# Patient Record
Sex: FEMALE | Race: WHITE
[De-identification: names, ages, dates, MRNs, and addresses within clinical notes are randomized per-mention and may not be internally consistent; named-entity substitution may affect disease eponyms.]

---

## 2019-03-13 ENCOUNTER — HOSPITAL ENCOUNTER (INPATIENT)
Dept: HOSPITAL 84 - D.ER | Age: 79
LOS: 7 days | Discharge: HOME | DRG: 885 | End: 2019-03-20
Attending: PSYCHIATRY & NEUROLOGY | Admitting: PSYCHIATRY & NEUROLOGY
Payer: MEDICARE

## 2019-03-13 VITALS — BODY MASS INDEX: 28.6 KG/M2 | BODY MASS INDEX: 28.7 KG/M2 | WEIGHT: 137 LBS

## 2019-03-13 VITALS — SYSTOLIC BLOOD PRESSURE: 133 MMHG | DIASTOLIC BLOOD PRESSURE: 67 MMHG

## 2019-03-13 VITALS — DIASTOLIC BLOOD PRESSURE: 67 MMHG | SYSTOLIC BLOOD PRESSURE: 133 MMHG

## 2019-03-13 DIAGNOSIS — E03.9: ICD-10-CM

## 2019-03-13 DIAGNOSIS — G30.1: ICD-10-CM

## 2019-03-13 DIAGNOSIS — F33.1: Primary | ICD-10-CM

## 2019-03-13 DIAGNOSIS — F02.81: ICD-10-CM

## 2019-03-13 DIAGNOSIS — E78.5: ICD-10-CM

## 2019-03-13 DIAGNOSIS — R45.851: ICD-10-CM

## 2019-03-13 DIAGNOSIS — M17.12: ICD-10-CM

## 2019-03-13 LAB
ALBUMIN SERPL-MCNC: 3.6 G/DL (ref 3.4–5)
ALP SERPL-CCNC: 94 U/L (ref 46–116)
ALT SERPL-CCNC: 18 U/L (ref 10–68)
AMPHETAMINES UR QL SCN: NEGATIVE QUAL
ANION GAP SERPL CALC-SCNC: 12.6 MMOL/L (ref 8–16)
APAP SERPL-MCNC: 0 UG/ML (ref 10–30)
APPEARANCE UR: CLEAR
APTT BLD: 33.3 SECONDS (ref 22.8–39.4)
BARBITURATES UR QL SCN: NEGATIVE QUAL
BASOPHILS NFR BLD AUTO: 0.6 % (ref 0–2)
BENZODIAZ UR QL SCN: NEGATIVE QUAL
BILIRUB SERPL-MCNC: 0.42 MG/DL (ref 0.2–1.3)
BILIRUB SERPL-MCNC: NEGATIVE MG/DL
BUN SERPL-MCNC: 11 MG/DL (ref 7–18)
BZE UR QL SCN: NEGATIVE QUAL
CALCIUM SERPL-MCNC: 8.6 MG/DL (ref 8.5–10.1)
CANNABINOIDS UR QL SCN: NEGATIVE QUAL
CHLORIDE SERPL-SCNC: 105 MMOL/L (ref 98–107)
CHOLEST/HDLC SERPL: 6.3 RATIO (ref 2.3–4.1)
CO2 SERPL-SCNC: 28 MMOL/L (ref 21–32)
COLOR UR: YELLOW
CREAT SERPL-MCNC: 0.8 MG/DL (ref 0.6–1.3)
EOSINOPHIL NFR BLD: 3.4 % (ref 0–7)
ERYTHROCYTE [DISTWIDTH] IN BLOOD BY AUTOMATED COUNT: 14.1 % (ref 11.5–14.5)
EST. AVERAGE GLUCOSE BLD GHB EST-MCNC: 143 MG/DL (ref 74–154)
ETHANOL SERPL-MCNC: 0 MG/DL (ref 0–10)
GLOBULIN SER-MCNC: 3.3 G/L
GLUCOSE SERPL-MCNC: 99 MG/DL (ref 74–106)
GLUCOSE SERPL-MCNC: NEGATIVE MG/DL
HCT VFR BLD CALC: 42.8 % (ref 36–48)
HDLC SERPL-MCNC: 51 MG/DL (ref 32–96)
HGB BLD-MCNC: 14.6 G/DL (ref 12–16)
IMM GRANULOCYTES NFR BLD: 0.2 % (ref 0–5)
INR PPP: 0.95 (ref 0.85–1.17)
KETONES UR STRIP-MCNC: NEGATIVE MG/DL
LDL-HDL RATIO: 4.3 RATIO (ref 1.5–3.5)
LDLC SERPL-MCNC: 218 MG/DL (ref 0–100)
LYMPHOCYTES NFR BLD AUTO: 48.9 % (ref 15–50)
MAGNESIUM SERPL-MCNC: 1.9 MG/DL (ref 1.8–2.4)
MCH RBC QN AUTO: 30.8 PG (ref 26–34)
MCHC RBC AUTO-ENTMCNC: 34.1 G/DL (ref 31–37)
MCV RBC: 90.3 FL (ref 80–100)
MONOCYTES NFR BLD: 8.2 % (ref 2–11)
NEUTROPHILS NFR BLD AUTO: 38.7 % (ref 40–80)
NITRITE UR-MCNC: NEGATIVE MG/ML
OPIATES UR QL SCN: NEGATIVE QUAL
OSMOLALITY SERPL CALC.SUM OF ELEC: 281 MOSM/KG (ref 275–300)
PCP UR QL SCN: NEGATIVE QUAL
PH UR STRIP: 8 [PH] (ref 5–6)
PLATELET # BLD: 242 10X3/UL (ref 130–400)
PMV BLD AUTO: 8.8 FL (ref 7.4–10.4)
POTASSIUM SERPL-SCNC: 3.6 MMOL/L (ref 3.5–5.1)
PROT SERPL-MCNC: 6.9 G/DL (ref 6.4–8.2)
PROT UR-MCNC: NEGATIVE MG/DL
PROTHROMBIN TIME: 12.2 SECONDS (ref 11.6–15)
RBC # BLD AUTO: 4.74 10X6/UL (ref 4–5.4)
SODIUM SERPL-SCNC: 142 MMOL/L (ref 136–145)
SP GR UR STRIP: 1.01 (ref 1–1.02)
TRIGL SERPL-MCNC: 252 MG/DL (ref 30–200)
TSH SERPL-ACNC: 0.59 UIU/ML (ref 0.36–3.74)
UROBILINOGEN UR-MCNC: NORMAL MG/DL
WBC # BLD AUTO: 6.5 10X3/UL (ref 4.8–10.8)

## 2019-03-13 NOTE — NUR
PT DAUGHTER JOSE STATES SHE AND PT HAVE BEEN TRYING SINCE NOVEMBER TO HAVE PT
MEDICATIONS ADJUSTED AND/OR PT SEEN BY PSYCHIATRIST AND HAVE NOT BEEN ABLE TO
GET A RESPONSE FROM PCP.  DAUGHTER STATES PT MAY NOT BE COMPLIANT WITH
MEDICATIONS.  RECENTLY CHANGED PHARMACIES SO THAT PT RECEIVES MEDS IN A BUBBLE
PACK.  DAUGHTER FURTHER STATES THAT PT BEST FRIEND HAD A FRIEND DIE AND PT HAS
BEEN THE PRIMARY SUPPORT SYSTEM FOR THE BEST FRIEND.  DAUGHTER STATES THIS HAS
BEEN VERY HARD ON PT.  THE PERSON  LAST WEEK.  APPARENTLY, PT HAS BEEN VERY
ACTIVE ON HER PHONE SEARCHING FOR HELP FOR HER FRIEND.  DAUGHTER STATES PT HAS
CALLED HER ABOUT 25 TIMES THIS PAST WEEK. PT REMAINS PLEASANT, CALM,
COOPERATIVE.

## 2019-03-13 NOTE — NUR
PT GIVEN URINE CUP AND SENT TO BATHROOM.  PT EXITS BATHROOM WITH NO URINE
SAMPLE.  STATES SHE DIDN'T REALIZE SHE NEEDED TO PROVIDE A SAMPLE.  H2O TO PT
TO AID IN PRODUCING A SAMPLE.

## 2019-03-14 VITALS — DIASTOLIC BLOOD PRESSURE: 67 MMHG | SYSTOLIC BLOOD PRESSURE: 121 MMHG

## 2019-03-14 VITALS — DIASTOLIC BLOOD PRESSURE: 58 MMHG | SYSTOLIC BLOOD PRESSURE: 109 MMHG

## 2019-03-14 LAB
ALBUMIN SERPL-MCNC: 3 G/DL (ref 3.4–5)
ALP SERPL-CCNC: 74 U/L (ref 46–116)
ALT SERPL-CCNC: 15 U/L (ref 10–68)
ANION GAP SERPL CALC-SCNC: 13.2 MMOL/L (ref 8–16)
ANISOCYTOSIS BLD QL SMEAR: (no result)
BASOPHILS NFR BLD AUTO: 1 % (ref 0–2)
BILIRUB SERPL-MCNC: 0.38 MG/DL (ref 0.2–1.3)
BUN SERPL-MCNC: 12 MG/DL (ref 7–18)
CALCIUM SERPL-MCNC: 8.5 MG/DL (ref 8.5–10.1)
CHLORIDE SERPL-SCNC: 107 MMOL/L (ref 98–107)
CHOLEST/HDLC SERPL: 6.2 RATIO (ref 2.3–4.1)
CO2 SERPL-SCNC: 25.9 MMOL/L (ref 21–32)
CREAT SERPL-MCNC: 0.8 MG/DL (ref 0.6–1.3)
EOSINOPHIL NFR BLD: 1 % (ref 0–7)
ERYTHROCYTE [DISTWIDTH] IN BLOOD BY AUTOMATED COUNT: 14.2 % (ref 11.5–14.5)
EST. AVERAGE GLUCOSE BLD GHB EST-MCNC: 103 MG/DL (ref 74–154)
GLOBULIN SER-MCNC: 3.1 G/L
GLUCOSE SERPL-MCNC: 88 MG/DL (ref 74–106)
HCT VFR BLD CALC: 37.5 % (ref 36–48)
HDLC SERPL-MCNC: 44 MG/DL (ref 32–96)
HGB BLD-MCNC: 12.8 G/DL (ref 12–16)
LDL-HDL RATIO: 4.5 RATIO (ref 1.5–3.5)
LDLC SERPL-MCNC: 199 MG/DL (ref 0–100)
LYMPHOCYTES NFR BLD AUTO: 50 % (ref 15–50)
MCH RBC QN AUTO: 30.6 PG (ref 26–34)
MCHC RBC AUTO-ENTMCNC: 34.1 G/DL (ref 31–37)
MCV RBC: 89.7 FL (ref 80–100)
MONOCYTES NFR BLD: 10 % (ref 2–11)
NEUTROPHILS NFR BLD AUTO: 35 % (ref 40–80)
OSMOLALITY SERPL CALC.SUM OF ELEC: 281 MOSM/KG (ref 275–300)
PLATELET # BLD EST: NORMAL 10*3/UL
PLATELET # BLD: 197 10X3/UL (ref 130–400)
PMV BLD AUTO: 8.8 FL (ref 7.4–10.4)
POTASSIUM SERPL-SCNC: 4.1 MMOL/L (ref 3.5–5.1)
PROT SERPL-MCNC: 6.1 G/DL (ref 6.4–8.2)
RBC # BLD AUTO: 4.18 10X6/UL (ref 4–5.4)
SODIUM SERPL-SCNC: 142 MMOL/L (ref 136–145)
TRIGL SERPL-MCNC: 141 MG/DL (ref 30–200)
TSH SERPL-ACNC: 0.64 UIU/ML (ref 0.36–3.74)
WBC # BLD AUTO: 5.8 10X3/UL (ref 4.8–10.8)

## 2019-03-14 NOTE — NUR
The patient had a visitor today and she fussed and complained about the unit.
She said "Where is my therapy to help me with my depression?" Explained to her
that normally [facility] gives the groups, but she is training someone today. She is
on the phone making complaints about the unit. She says "Am I going to have to
sit here all day?" Tried to explain that she'll have groups. She is
displeased about being here.

## 2019-03-14 NOTE — NUR
B) The patient is awake and alert, she is pleasant, she denies any S.I. She is
depressed, but she has a reactive affect. She is sitting in room 1136, but we
will check on her frequently. She ambulates independently. I) Provide
prescribed meds. Encourage groups interaction. R) The patient did speak with
Dr. Aguila. P) Continue POC.

## 2019-03-14 NOTE — NUR
NEW ADMIT TO DOCTOR DAVIDSON ON SENIOR CARE FROM [facility] ED FOR SUICIDAL IDEATION.
PATIENT CALLED DOCTORS OFFICE AND STATED SHE NEEDED HELP BECAUSE SHE WAS
HAVING SUICIDAL THOUGHTS OF JUMPING OUT OF WINDOW ON THE 9TH FLOOR. PATIENT
STATED SHE DID SAY THIS BUT DIDNT REALLY MEAN THAT SHE WAS GOING TO DO IT.
STATED SHE JUST WANTED HELP AND HER MEDICATION CHANGED BECAUSE SHE WAS VERY
DEPRESSED AND HAVING SUICIDAL THOUGHTS. PATIENT DENIES THOUGHTS OF SELF HARM
AT THIS TIME. PATIENT WAS CALM AND COOPERATIVE WITH ADMISSION ASSESSMENTS.
CODE STATUS DISCUSSED WITH PATIENT AND PATIENT WANTS TO BE A FULL CODE.
PATIENTS CODE WORD IS FOXY. PATIENT RESTING IN BED WITH EYES OPEN AT THIS
TIME.

## 2019-03-15 VITALS — SYSTOLIC BLOOD PRESSURE: 122 MMHG | DIASTOLIC BLOOD PRESSURE: 64 MMHG

## 2019-03-15 VITALS — SYSTOLIC BLOOD PRESSURE: 106 MMHG | DIASTOLIC BLOOD PRESSURE: 70 MMHG

## 2019-03-15 LAB
25(OH)D3 SERPL-MCNC: 43.5 NG/ML (ref 30–100)
FOLATE SERPL-MCNC: 10.3 NG/ML (ref 3–?)
VIT B12 SERPL-MCNC: 512 PG/ML (ref 232–1245)

## 2019-03-15 NOTE — NUR
B) The patient is awake and alert, she is pleasnt and calm right now. She is
sitting in the day room enjoying the groups today. She ambulates
independently. I) Provide prescribed meds. R) The patient is compliant with
meds and unit milieu. She denies S.I. today. P) Continue POC.
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
## 2019-03-15 NOTE — NUR
B) Patient is alert and oriented, calm and cooperative, no S.I this shift,
I) Administered scheduled medications as ordered, monitored for safety
R) Medication compliant, pleasant and friendly toward staff, follows unit
milieu.
P) Continue plan of care.

## 2019-03-16 VITALS — DIASTOLIC BLOOD PRESSURE: 58 MMHG | SYSTOLIC BLOOD PRESSURE: 110 MMHG

## 2019-03-16 VITALS — DIASTOLIC BLOOD PRESSURE: 83 MMHG | SYSTOLIC BLOOD PRESSURE: 132 MMHG

## 2019-03-16 NOTE — NUR
B) Patient is alert and oriented to person, place and time, no S.I this shift,
I) Administered scheduled medications as ordered, redirected as needed,
R) Mediation compliant, resting quietly in her bed,
P) Continue plan of care.

## 2019-03-17 VITALS — DIASTOLIC BLOOD PRESSURE: 48 MMHG | SYSTOLIC BLOOD PRESSURE: 137 MMHG

## 2019-03-17 NOTE — NUR
B) Patient is alert and oriented to person and place, calm and cooperative
this shift, guarded at times,
I) Administered scheduled medications as ordered, monitored for safety
R) Mediation compliant, sleeping quietly in her bed,
P) Continue plan of care.

## 2019-03-17 NOTE — NUR
PT IS AWAKE AND ALERT. CALM AND COOPERATIVE WITH ASSESSMENT. PT AMBULATES
INDEPENDENTLY. PRESCRIBED MEDS PROVIDED. MED COMPLIANT. REDIRECT AND REORIENT
AS NEEDED. WILL CONTINUE TO MONITOR Q 15 MINUTES FOR SAFETY. WILL CPOC.

## 2019-03-18 VITALS — DIASTOLIC BLOOD PRESSURE: 70 MMHG | SYSTOLIC BLOOD PRESSURE: 136 MMHG

## 2019-03-18 VITALS — SYSTOLIC BLOOD PRESSURE: 129 MMHG | DIASTOLIC BLOOD PRESSURE: 60 MMHG

## 2019-03-18 NOTE — NUR
B) PT IS ALERT. CALM AND COOPERATIVE WITH ASSESSMENT.
I) PRESCRIBED MEDS PROVIDED
R) MED COMPLIANT
P) WILL CPOC

## 2019-03-19 VITALS — DIASTOLIC BLOOD PRESSURE: 55 MMHG | SYSTOLIC BLOOD PRESSURE: 107 MMHG

## 2019-03-19 VITALS — SYSTOLIC BLOOD PRESSURE: 122 MMHG | DIASTOLIC BLOOD PRESSURE: 65 MMHG

## 2019-03-19 NOTE — HP
PATIENT: MANDO NEIL                                  MEDICAL RECORD: [id number]
ACCOUNT: [id number]                                    LOCATION:[facility]FR[facility]
: 36                                            ADMISSION DATE: 19
                                                         PCP: MATTIE VENEGAS MD    
 
                             HISTORY AND PHYSICAL EXAMINATION
 
 
HISTORY OF PRESENT ILLNESS:  Ms. Neil is an 78-year-old female who lives in
[facility] and apparently called her physician's office stating that she was
thinking about that she was suicidal.  Emergency services were dispatched and
the patient once admitted seemed very surprised that all this happened.  We
rediscussed this today.  The patient states that she has been having a bad day
and thought that she could just find someone to talk to.  She states that she
minimizes any suicidality.  We discussed the potential consequences of calling
anyone and saying that she is suicidal, and she states she has now learned that
her wording was important.  Her daughter, Afia Guevara, reports that she has a
DPOA, we are awaiting that.  The patient does state that daughter has one and
 to talk to her.  The patient's nursing report apparently she is
fairly negative, complaining.  We discussed her getting the most she can out of
her hospitalization and working on what she can change at home to better handle
her despondent moments.  She is eating 50%, 95%, and 90%.  She slept 8.25 hours.
 
PHYSICAL EXAMINATION:
VITAL SIGNS:  Her latest vital signs are; temperature 97.5, pulse 82,
respirations 17, blood pressure 132/83 and oxygen saturation 97%.
 
Last bowel movement was 03/15/2019.
 
ASSESSMENT:  Unchanged.
 
PLAN:  Get further information from family and get the DPOA and get their input.
 We will continue to monitor for suicidal ideation.  Case discussed with
nursing.  Chart was reviewed and the patient interviewed.
 
TRANSINT:[id number] Voice Confirmation ID: [id number] DOCUMENT ID: [id number]
 
 
                                           
                                           JAC ONEAL MD           
 
 
 
Electronically Signed by JAC ONEAL on 19 at 1625
 
 
 
 
 
 
CC:                                                             [id number]
DICTATION DATE: 19 1327     :     19 1354      ADM IN  
                                                                              
[id number] [place] [address]

## 2019-03-19 NOTE — PN
PATIENT:MANDO NEIL                            MEDICAL RECORD: [id number]
                                                         LOCATION:[facility]
                                                         ADMISSION DATE: 03/13/19
 
PROGRESS NOTE
 
 
DATE OF SERVICE:  03/18/2019
 
SUBJECTIVE:  Ms. Neil is an 78-year-old female who lives alone in [facility].  She apparently called her doctor's office stating that she was suicidal
and much to her chagrin the doctor's office called the emergency services and
she was admitted.  Almost immediately upon admission, she was minimizing her
suicidal ideation, had a 30-minute phone talk with her daughter, Afia Guevara
from [place] and Ms. Guevara states that she with her daughter will threaten
as her grandmother did with the patient before her and the patient apparently
has just never done it with medical staff.  Ms. Guevara says that the patient
used to go to [facility], but was dissatisfied particularly with
psychiatric care there and her counselor quit going.  Ms. Guevara has tried to
report paranoid symptoms to the patient's GP.  The patient apparently continues
to think that different staff at [facility] is stealing from her and therefore
does not want to leave the [facility] very often.  However, the patient's daughter
states she can still use the bus system.  She still gets out with peers.  I go
over the patient's medications and treatment plan with the daughter.  The
patient is eating 50%, 25%, and 100%.  Last bowel movement 03/17/2019.  Slept
7.5 hours.
 
OBJECTIVE:  Her latest vitals are temperature 97.5, pulse 71, respirations 17,
blood pressure 136/70, and saturation 93%.
 
ASSESSMENT:  Unchanged.
 
PLAN:  We anticipate discharge tomorrow after appointments can be made.  The
patient continues to deny SI.  Case discussed with treatment team and again a
30-minute call with the patient's daughter.  Chart was reviewed and the patient
interviewed.
 
TRANSINT:[id number] Voice Confirmation ID: [id number] DOCUMENT ID: [id number]
 
 
 
 
                                           
                                           JAC ONEAL MD           
 
 
 
Electronically Signed by JAC ONEAL on 03/19/19 at 1625
 
 
 
CC:                                                             [id number]
DICTATION DATE: 03/18/19 1341     :     03/18/19 1424      ADM IN  
                                                                              
[place] [address]

## 2019-03-20 VITALS — DIASTOLIC BLOOD PRESSURE: 65 MMHG | SYSTOLIC BLOOD PRESSURE: 112 MMHG

## 2019-03-20 NOTE — NUR
B) PATIENT IS ALERT AND ORIENTED.  SHE IS CALM AND COOPERAIVE WITH ASSESSMENT.
I) PRESCRIBEDM MEDICATIONS PROVIDED.
R) COMPLIANT WITH MEDICATIONS.
P) WILL CONTINUE PLAN OF CARE.

## 2019-03-20 NOTE — NUR
B) patient is alert and oriented to person, place, time and situation, calm
and cooperative, no S.I. this shift, contracts for safety
I) Administered scheduled medications as ordered, monitored for safety,
R) Mediation compliant, pleasant and friendly toward staff,
P) Continue plan of care.

## 2019-03-23 NOTE — PN
PATIENT:MANDO NEIL                            MEDICAL RECORD: [id number]
                                                         LOCATION:[facility]
                                                         ADMISSION DATE: 03/13/19
 
PROGRESS NOTE
 
 
DATE OF SERVICE:  03/19/2019
 
SUBJECTIVE:  Ms. Neil is an 78-year-old female who came after reporting to
her PCP that she was suicidal and thinking about jumping off the ninth floor
window.  She seemed surprised when emergency services came and has minimized her
actual suicidality ever since.  Multiple and lengthy discussions with her
daughter to set up outpatient care including a new PCP and for her to go back to
the [facility] and to get her involved in activities again. 
The patient states when I asked her specifically about feeling like her stuff is
being stolen, according to the daughter, the patient has accused that every
 in the building of stealing stuff from her and that is primarily
the reason she will not leave her apartment it is because she is scared she is
getting stolen from.  The patient is convinced that it is happening.  We
discussed how that is a possible early symptom of dementia and have asked her to
consider carefully whether her things are being stolen or misplaced.
 
She is eating 100% of meals, last bowel movement on 17th, and slept 6 hours.
 
ASSESSMENT:  Unchanged.
 
PLAN:  Continue current meds.  Case discussed with nursing.  Chart was reviewed
and the patient interviewed.  Anticipate discharge tomorrow.
 
TRANSINT:[id number] Voice Confirmation ID: [id number] DOCUMENT ID: [id number]
 
 
 
 
                                           
                                           JAC ONEAL MD           
 
 
 
Electronically Signed by JAC ONEAL on 03/23/19 at 1252
 
 
 
 
 
 
 
 
 
 
CC:                                                             [id number]
DICTATION DATE: 03/19/19 1715     :     03/19/19 2321      DIS IN  
                                                                      03/20/19
[facility]                                          
[address] [place] [address]

## 2019-03-23 NOTE — DS
PATIENT:MANDO NEIL                    :36   MEDICAL RECORD: [id number]
 
                              DISCHARGE SUMMARY
                                                         
ADMISSION DATE:    19                       DISCHARGE DATE:     19
 
 
REASON FOR ADMISSION:  Ms. Neil is an 78-year-old female admitted after she
called her primary care provider and stating that she had suicidal ideation with
a plan to jump off the ninth floor of her apartment building.
 
HOSPITAL COURSE:  The patient was admitted and immediately minimized any real
suicidal intent.  Later in the admission, with the conversation with daughter
apparently, the patient has many times, had parasuicidal ideations.  The patient
has had a long history of therapy for such and family history.  Her mother doing
the same as well according to daughter.  The patient's daughter did inform me
that the patient has not been leaving her room a lot because she feels that
maintenance men are stealing from her.  Apparently, video feeds have been
obtained and there has been no evidence of this, but it has kept the patient
from going out and apparently was discharged from [facility] behavioral outpatient
group because of nonattendance for this.  During the course of this
hospitalization, the patient has been at first irritable and negative, but as
admission proceeds, she is calm and cooperative.  Her sleeping, appetite has
been good.  We processed with her daily better methods in which to get her
feelings vented or frustrations met other than what happened before, which the
patient seems to be fully aware of.  During the course of admission, on initial
interview, dementia and mild neurocognitive disorder was diagnosed.
 
ASSESSMENT:  Minor neurocognitive disorder of Alzheimer's type, disruptive mood
dysregulation disorder, history of major depressive episode.
 
DISCHARGE MEDICATIONS:  Effexor 50 mg 1 p.o. b.i.d., Tamiflu 75 mg 1 p.o. every
day times 1 more day, Synthroid 100 mcg 1 p.o. every day, Namenda 2.5 mg 1 p.o.
b.i.d.
 
Discharge back to home with [facility]
followup appointment made and also volunteer position initiated for patient
across the street from her house and a new PCP appointment has been made for her
with Dr. Aguila.
 
TRANSINT:[id number] Voice Confirmation ID: [id number] DOCUMENT ID: [id number]
                                           
                                           JAC ONEAL MD           
 
 
 
Electronically Signed by JAC ONEAL on 19 at 8174
 
 
 
CC:                                                             [id number]
DICTATION DATE: 19 1316     :     19 0117      DIS IN  
                                                                      19
[facility]                                          
[address] [id number]

## 2019-03-25 NOTE — PN
PATIENT:MANDO NEIL                            MEDICAL RECORD: [id number]
                                                         LOCATION:[facility]ED
                                                         ADMISSION DATE: 03/13/19
 
PROGRESS NOTE
 
 
DATE OF SERVICE:  03/15/2019
 
SUBJECTIVE:  The patient's case was discussed with staff.  She has no new
complaint.
 
OBJECTIVE:  The patient is clearly suffering from a dementia.  She is oriented,
but orientation or lack of orientation is not the point, at which someone
develops dementia.  It is the point at which someone's dementia goes from being
mild to moderate or severe.  I have diagnosed her with Alzheimer's.  I have
discussed this situation with her and have started her on Namenda.  She will be
maintained on the Effexor for its antidepressant effect.  This is the second day
that she has denied any thoughts of harming herself or others.
 
TRANSINT:[id number] Voice Confirmation ID: [id number] DOCUMENT ID: [id number]
 
 
 
 
                                           
                                           JAVI DAVIDSON MD             
 
 
 
Electronically Signed by JAVI DAVIDSON on 03/25/19 at 1316
 
 
 
 
 
 
 
 
 
 
 
 
 
 
 
 
 
 
 
 
CC:                                                             [id number]
DICTATION DATE: 03/15/19 1339     :     03/15/19 1412      DIS IN  
                                                                      03/20/19
[facility]                                          
[address] [place] [address]

## 2019-07-26 ENCOUNTER — HOSPITAL ENCOUNTER (INPATIENT)
Dept: HOSPITAL 84 - D.PSYCH | Age: 79
LOS: 7 days | Discharge: NURSING, REHAB OR LONG-TERM CARE | DRG: 57 | End: 2019-08-02
Attending: FAMILY MEDICINE | Admitting: FAMILY MEDICINE
Payer: MEDICARE

## 2019-07-26 VITALS — SYSTOLIC BLOOD PRESSURE: 149 MMHG | DIASTOLIC BLOOD PRESSURE: 71 MMHG

## 2019-07-26 VITALS
WEIGHT: 137.09 LBS | BODY MASS INDEX: 26.91 KG/M2 | BODY MASS INDEX: 26.91 KG/M2 | HEIGHT: 60 IN | BODY MASS INDEX: 26.91 KG/M2 | WEIGHT: 137.09 LBS | HEIGHT: 60 IN

## 2019-07-26 VITALS — DIASTOLIC BLOOD PRESSURE: 65 MMHG | SYSTOLIC BLOOD PRESSURE: 119 MMHG

## 2019-07-26 DIAGNOSIS — M25.569: ICD-10-CM

## 2019-07-26 DIAGNOSIS — E03.9: ICD-10-CM

## 2019-07-26 DIAGNOSIS — G30.0: Primary | ICD-10-CM

## 2019-07-26 DIAGNOSIS — F41.8: ICD-10-CM

## 2019-07-26 DIAGNOSIS — R45.851: ICD-10-CM

## 2019-07-26 DIAGNOSIS — E78.5: ICD-10-CM

## 2019-07-26 DIAGNOSIS — E11.9: ICD-10-CM

## 2019-07-26 DIAGNOSIS — F02.81: ICD-10-CM

## 2019-07-26 NOTE — NUR
THIS NURSE SPOKE WITH DAUGHTER AT 1400 ABOUT ADMISSION TO UNIT, CODEWORD, AND
ITEMS TO BE BROUGHT TO UNIT. CALLED ER FOR WALLET LOCK-UP. AWAITING ER STAFF
TO RETRIEVE WALLET FROM STAFF. DAUGHTER IS POA AND GAVE CONSENT. WILL OBTAIN
CODE STATUS AT LATER TIME.

## 2019-07-26 NOTE — NUR
RECEIVED PATIENT TO UNIT FROM DR CEBALLOS'S OFFICE.  PT ALERT, CALM, TALKATIVE,
ORIENTED TO PERSON.  WHEN ASKED WHERE SHE IS AT THIS MOMENT, PT STATED, "[place] [facility]".  STATED THAT THE MONTH WAS JUNE AND THE YEAR WAS 2019.  PT ABLE TO
STATE THE NAME OF THE PRESIDENT.  PATIENT STATED THAT SHE WAS  BECAUSE
SHE WANTED TO BE "FREE".  ALSO STATED A HX OF MARIJUANA USE.  PATIENT CURSES
QUITE FREQUENTLY.  PATIENT STATED THAT HER NAME RASHAAD WAS NOT HER GIVEN
NAME.  INSTEAD, IT WAS A NAME SHE ADOPTED DURING HER ADULTHOOD.  STATED THAT
THE WORD WAS FROM  () LANGUAGE AND HAD A SPIRITUAL
MEANING.  STATED THAT SHE WAS BORN IN [place], BUT MOVED TO [place], [place] DURING HER CHILDHOOD.  PATIENT ADMITS TO SUICIDAL IDEATIONS BUT
DENIES A PLAN.  STATED THAT SHE HAD TWO DAUGHTERS IN [place] AT THIS TIME.  WHEN
SPEAKING OF SUICIDE, SHE STATED THAT SHE DID NOT GIVE A "F---" WHETHER HER
DAUGHTERS WERE HEARTBROKEN IF SHE COMMITTED SUICIDE.  PATIENT'S VITAL SIGNS
OBTAINED, T 98.1, B/P 149/71, P 80, R 17, SPO2 98%.  PT WEIGHED ON W/C SCALE
AND A WEIGHT .8 WAS NOTED.  PT ASSISTED TO DAYROOM TO JOIN PEERS.

## 2019-07-26 NOTE — NUR
PATIENT IS UPSET ON THE PHONE SPEAKING TO FRIEND. STATING "WHY DID YOU
TELL THEM ABOUT THE BED BUGS?" STAFF EXPLAINED TO FRIEND AND PATIENT THE
REGULATIONS BEHIND NOT ACCEPTING CLOTHES THAT COULD HAVE A POSSIBLE BEDBUG
ISSUE. PT DID NOT VERBALIZE UNDERSTANDING STATING "WELL I HAVE HAD THE BEDBUG
TREATMENT. YOU JUST HAVE TO WASH THEM REALLY WELL." STAFF INFORMED PATIENT WE
DO COMMUNITY WASHING IN THE UNIT. STILL DID NOT VERBALIZE UNDERSTANDING.

## 2019-07-27 VITALS — DIASTOLIC BLOOD PRESSURE: 62 MMHG | SYSTOLIC BLOOD PRESSURE: 107 MMHG

## 2019-07-27 VITALS — SYSTOLIC BLOOD PRESSURE: 107 MMHG | DIASTOLIC BLOOD PRESSURE: 62 MMHG

## 2019-07-27 VITALS — DIASTOLIC BLOOD PRESSURE: 69 MMHG | SYSTOLIC BLOOD PRESSURE: 107 MMHG

## 2019-07-27 LAB
ALBUMIN SERPL-MCNC: 3 G/DL (ref 3.4–5)
ALP SERPL-CCNC: 87 U/L (ref 46–116)
ALT SERPL-CCNC: 13 U/L (ref 10–68)
ANION GAP SERPL CALC-SCNC: 11.1 MMOL/L (ref 8–16)
BASOPHILS NFR BLD AUTO: 0.6 % (ref 0–2)
BILIRUB SERPL-MCNC: 0.59 MG/DL (ref 0.2–1.3)
BUN SERPL-MCNC: 14 MG/DL (ref 7–18)
CALCIUM SERPL-MCNC: 8.6 MG/DL (ref 8.5–10.1)
CHLORIDE SERPL-SCNC: 108 MMOL/L (ref 98–107)
CHOLEST/HDLC SERPL: 4.7 RATIO (ref 2.3–4.1)
CK MB SERPL-MCNC: 0.8 U/L (ref 0–3.6)
CK SERPL-CCNC: 39 UL (ref 21–215)
CO2 SERPL-SCNC: 27.9 MMOL/L (ref 21–32)
CREAT SERPL-MCNC: 0.7 MG/DL (ref 0.6–1.3)
EOSINOPHIL NFR BLD: 3.2 % (ref 0–7)
ERYTHROCYTE [DISTWIDTH] IN BLOOD BY AUTOMATED COUNT: 13.7 % (ref 11.5–14.5)
EST. AVERAGE GLUCOSE BLD GHB EST-MCNC: 114 MG/DL (ref 74–154)
GLOBULIN SER-MCNC: 3.4 G/L
GLUCOSE SERPL-MCNC: 88 MG/DL (ref 74–106)
HCT VFR BLD CALC: 37 % (ref 36–48)
HDLC SERPL-MCNC: 51 MG/DL (ref 32–96)
HGB BLD-MCNC: 12.8 G/DL (ref 12–16)
IMM GRANULOCYTES NFR BLD: 0.2 % (ref 0–5)
LDL-HDL RATIO: 3.3 RATIO (ref 1.5–3.5)
LDLC SERPL-MCNC: 169 MG/DL (ref 0–100)
LIPASE SERPL-CCNC: 148 U/L (ref 73–393)
LYMPHOCYTES NFR BLD AUTO: 45.9 % (ref 15–50)
MAGNESIUM SERPL-MCNC: 1.9 MG/DL (ref 1.8–2.4)
MCH RBC QN AUTO: 31.5 PG (ref 26–34)
MCHC RBC AUTO-ENTMCNC: 34.6 G/DL (ref 31–37)
MCV RBC: 91.1 FL (ref 80–100)
MONOCYTES NFR BLD: 9.5 % (ref 2–11)
NEUTROPHILS NFR BLD AUTO: 40.6 % (ref 40–80)
OSMOLALITY SERPL CALC.SUM OF ELEC: 284 MOSM/KG (ref 275–300)
PHOSPHATE SERPL-MCNC: 3.6 MG/DL (ref 2.5–4.9)
PLATELET # BLD: 209 10X3/UL (ref 130–400)
PMV BLD AUTO: 8.9 FL (ref 7.4–10.4)
POTASSIUM SERPL-SCNC: 4 MMOL/L (ref 3.5–5.1)
PROT SERPL-MCNC: 6.4 G/DL (ref 6.4–8.2)
RBC # BLD AUTO: 4.06 10X6/UL (ref 4–5.4)
SODIUM SERPL-SCNC: 143 MMOL/L (ref 136–145)
T4 FREE SERPL-MCNC: 0.98 NG/DL (ref 0.76–1.46)
TRIGL SERPL-MCNC: 104 MG/DL (ref 30–200)
TSH SERPL-ACNC: 0.3 UIU/ML (ref 0.36–3.74)
WBC # BLD AUTO: 5 10X3/UL (ref 4.8–10.8)

## 2019-07-27 NOTE — NUR
PATIENT HAS TIMES OF CONFUSION, MILD PARANOIA WITNESSED BY STUFFING THE BOTTOM
OF HER DOOR WITH PAPERTOWELS AND THINKING THAT PEOPLE HAS STOLEN HER
TOOTHPASTE. COMPLIANT WITH MEDS. DENIES ANY S/I

## 2019-07-27 NOTE — NUR
SUICIDE SCREENING DONE WITH A WITNESS FROM [facility], THE QUESTIONS
WERE ASKED AND PATIENT DENIES WANTING TO HARM HERSELF, SHE SAID, "BELIEVE ME,
I LOVE MYSELF, I AM ONLY 78, I HAVE 20 YEARS LEFT".

## 2019-07-28 VITALS — DIASTOLIC BLOOD PRESSURE: 63 MMHG | SYSTOLIC BLOOD PRESSURE: 101 MMHG

## 2019-07-28 VITALS — SYSTOLIC BLOOD PRESSURE: 115 MMHG | DIASTOLIC BLOOD PRESSURE: 66 MMHG

## 2019-07-28 NOTE — NUR
PT IS AWAKE AND ALERT. PT CALM AND COOPERATIVE WITH ASSESSMENT. MED
COMPLIANT. FALL PRECAUTIONS IN PLACE. NO AGGRESSION NOTED. WILL CPOC.

## 2019-07-28 NOTE — NUR
RECEIVED IN DAYROOM. SITTING IN A CHAIR WITH PEERS AT HER SIDE.  CALM AND
COOPERATIVE WITH CARE AND ASSESSMENT. NO SIGNS OF PARANOIA. REDIRECT AND
REORIENT AS NEEDED. RESTING QUIETLY IN BED AT THIS TIME. CONTINUE PLAN OF CARE

## 2019-07-28 NOTE — NUR
PATIENT BECAME UPSET WITH AMANDA SAVAGE. WITH SOME OF THE PATIENTS. PATIENT
REMOVED HERSELF INTO OTHER ROOM AND BEGAN TO BECOME UPSET WITH ANOTHER
PATIENT. THIS NURSE REDIRECT PATIENT. PT STATED THAT DR. DAVIDSON DID NOT EVEN
TALK TO HER THAT HE JUST TOLD HER TO TAKE OFF HER FUCKING TOE RING. NURSE
EXPLAINED THAT SHE WOULD OBTAIN HANDBOOK SO THAT PATIENT CAN SEE THE POLICY.
PT AGREED AND CALMED DOWN.

## 2019-07-28 NOTE — PSY
PATIENT NAME:MANDO NEIL                                MEDICAL RECORD: [id number]
: 36                                              LOCATION:[facility]
ADMISSION DATE: 19    ACCOUNT: [id number]
                                                           
PSYCHIATRIC EVALUATION
 
 
DATE OF EVALUATION: 19
 
 
IDENTIFYING DATA:  The patient is 78 years old and she is admitted to the
hospital on a voluntary basis.
 
CHIEF COMPLAINT:  Paranoia.
 
HISTORY OF PRESENT ILLNESS:  The patient has a known and established diagnosis
of dementia.  She apparently presented to Dr. Aguila's office yesterday and was
showing evidence of confusion, paranoid thoughts, and some suicidal ideation. 
She now is back tracking away from those statements.  Dr. Aguila was concerned
enough that he referred her for admission.  The patient apparently is living
alone in [facility] with a cat.  She lives on the 7th floor.  She has become
paranoid and has not been taking her medications.  She believes that the
 in the building is stealing things from her.  She claims he has
stolen her computer.  Apparently, these accusations have been looked into. 
There are numerous.  There is no evidence of any actual theft and the
disturbance that she is making in the building with these delusional accusations
are about to result in her being kicked out of the facility.
 
PAST MEDICAL HISTORY:  Significant for hypothyroidism and osteoarthritis.
 
PAST PSYCHIATRIC HISTORY:  Significant for an established diagnosis of dementia
and indeed the patient was hospitalized here in March of this year for very
similar symptoms.
 
FAMILY HISTORY:  Noncontributory.
 
SOCIAL HISTORY:  The patient is .  She has 2 adult children who live in
[place].  She is as mentioned above, living alone.  She has no history of
drug or alcohol abuse; however, she is a former cigarette smoker.  She
apparently worked as a seamstress.
 
MENTAL STATUS EXAMINATION:  The patient is awake, alert, and oriented to person
and place, but not to time or situation.  Her mood is euthymic.  Her affect is
appropriate.  Thought processes are circumstantial.  Memory, concentration, and
abstraction abilities are moderately impaired and she denies any intent to harm
herself or others as well as any active psychotic symptoms.
 
ASSETS:  Supportive family members.
 
LIABILITIES:  Limited insight.
 
DIAGNOSTIC IMPRESSION:
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
symptoms.
AXIS II:  None.
AXIS III:  Osteoarthritis and hypothyroidism.
AXIS IV:  Moderate stressors.
AXIS V:  Global assessment of functioning is 30.
 
 
PLAN:  At this time, the patient is admitted to the hospital secondary to
psychotic symptoms associated with a dementing illness.  She will be
comprehensively evaluated from both a medical, psychological, and social
standpoint.  She will be treated with both memory enhancing and antipsychotic
medications as deemed appropriate based upon the outcome of the observation and
evaluation.
 
TRANSINT:[id number] Voice Confirmation ID: [id number] DOCUMENT ID: [id number]
                                           
                                           JAVI DAVIDSON MD             
 
 
 
Electronically Signed by JAVI DAVIDSON on 19 at 1220
 
 
 
 
 
 
 
 
 
 
 
 
 
 
 
 
 
 
 
 
 
 
 
 
 
 
 
 
 
 
 
 
 
 
 
 
 
CC:                                                             [id number]
DICTATION DATE: 19 1227     :     19 1239      ADM IN  
                                                                              
[id number] [place] [id number]

## 2019-07-29 VITALS — DIASTOLIC BLOOD PRESSURE: 55 MMHG | SYSTOLIC BLOOD PRESSURE: 109 MMHG

## 2019-07-29 VITALS — DIASTOLIC BLOOD PRESSURE: 60 MMHG | SYSTOLIC BLOOD PRESSURE: 108 MMHG

## 2019-07-29 NOTE — PN
PATIENT:MANDO NEIL                            MEDICAL RECORD: [id number]
                                                         LOCATION:[facility]
                                                         ADMISSION DATE: 07/26/19
 
PROGRESS NOTE
 
 
DATE OF SERVICE:  07/28/2019
 
SUBJECTIVE:  The patient's case was discussed with staff.  She has no new
complaint.
 
OBJECTIVE:  The patient denies intent to harm herself or others.  She is
tolerating her medicines well.
 
ASSESSMENT:  No change in diagnoses.
 
PLAN:  The patient continues to have delusions about people coming into her
house or apartment actually and stealing things from her.  She did not bring
this up, I did.  Once it was brought up though, she becomes very upset and
agitated.  Today is Sunday and the  will not be back until
tomorrow.  It is my understanding that the apartment building, which is for low
income elderly people, is going to ask her to leave because of her disruptive
behaviors there that are associated with a delusion about people stealing from
her.  The delusions are related to a dementing illness.  At this point, I am
going to maintain her on her current medications in an effort to see if perhaps
the delusions are related more to a lack of social cueing and support or if an
actual antipsychotic medication is going to be necessary.  I think the patient
is inappropriate to live independently as she is too severely impaired.  I will
meet with the treatment team to discuss these options tomorrow.
 
TRANSINT:[id number] Voice Confirmation ID: [id number] DOCUMENT ID: [id number]
 
 
 
 
                                           
                                           JAVI DAVIDSON MD             
 
 
 
Electronically Signed by JAVI DAVIDSON on 07/29/19 at 1445
 
 
 
 
 
 
 
 
 
CC:                                                             [id number]
DICTATION DATE: 07/28/19 1224     :     07/28/19 1401      ADM IN  
                                                                              
[facility]                                          
[address] [place] [address]

## 2019-07-29 NOTE — NUR
RECEIVED IN DAYROOM. SITTING IN A CHAIR WITH PEERS AT HER SIDE. CALM AND
COOPERAITVE WITH CARE AND ASSESSMENT. NO SIGNS OF PARANOIA. REDIRECT AND
REORIENT AS NEEDED. CONSITUES TO SIT QUIETLY. CONTINUE PLAN OF CARE

## 2019-07-29 NOTE — NUR
PT IS AWAKE AND ALERT. CALM AND COOPERATIVE WITH ASSESSMENT. MED COMPLIANT. NO
AGGRESSION NOTED. FALL PRECAUTIONS IN PLACE. WILL CPOC.

## 2019-07-30 VITALS — DIASTOLIC BLOOD PRESSURE: 73 MMHG | SYSTOLIC BLOOD PRESSURE: 118 MMHG

## 2019-07-30 VITALS — SYSTOLIC BLOOD PRESSURE: 114 MMHG | DIASTOLIC BLOOD PRESSURE: 58 MMHG

## 2019-07-30 LAB
APPEARANCE UR: CLEAR
BILIRUB SERPL-MCNC: NEGATIVE MG/DL
COLOR UR: YELLOW
GLUCOSE SERPL-MCNC: NEGATIVE MG/DL
KETONES UR STRIP-MCNC: NEGATIVE MG/DL
NITRITE UR-MCNC: NEGATIVE MG/ML
PH UR STRIP: 5 [PH] (ref 5–6)
PROT UR-MCNC: NEGATIVE MG/DL
SP GR UR STRIP: 1.01 (ref 1–1.02)
UROBILINOGEN UR-MCNC: NORMAL MG/DL

## 2019-07-30 NOTE — NUR
RECEIVED PATIENT IN DINING ROOM FOR B'FAST, ALERT, CALM, COOPERATIVE.  MEDS
ADMIN PER ORDERS WITH COMPLETE MED COMPLIANCE NOTED.  COOPERATIVE WITH POC.
NI SUICIDAL IDEATIONS NOTED.  CONT POC AS DIRECTED.

## 2019-07-30 NOTE — NUR
RECEIVED IN DAYROOM. RESTING IN A CHAIR WITH PEERS AT HER SIDE. CALM AND
COOPERATIVE WITH CARE AND ASSESSMENT. NO SIGNS OF PARANOIA. REDIRECT AND
REORIENT AS NEEDED. CONTINUES TO SIT QUIETLY. CONTINUE PLAN OF CARE

## 2019-07-30 NOTE — PN
PATIENT:MANDO NEIL                            MEDICAL RECORD: [id number]
                                                         LOCATION:[facility]
                                                         ADMISSION DATE: 07/26/19
 
PROGRESS NOTE
 
 
DATE OF SERVICE:  07/29/2019
 
SUBJECTIVE:  The patient's case was discussed with staff.  She has no new
complaint.
 
OBJECTIVE:  The patient is in good behavioral control with limited insight about
her condition.  She tolerates her medicines well.
 
ASSESSMENT:  No change in diagnoses.
 
PLAN:  The patient is going to be given Aricept at a low dose to deal with her
cognitive impairment.  She has not made any mention of the 
stealing from her at the apartment complex, but I did not bring it up.  I am
sure, if I did, she would have gotten highly agitated and tell me all about it.
 
TRANSINT:[id number] Voice Confirmation ID: [id number] DOCUMENT ID: [id number]
 
 
 
 
                                           
                                           JAVI DAVIDSON MD             
 
 
 
Electronically Signed by JAVI DAVIDSON on 07/30/19 at 1114
 
 
 
 
 
 
 
 
 
 
 
 
 
 
 
 
 
 
CC:                                                             [id number]
DICTATION DATE: 07/29/19 1531     :     07/29/19 1708      ADM IN  
                                                                              
[id number] [place] [address]

## 2019-07-30 NOTE — NUR
SW SPOKE WITH PT'S DTR, JOSE, TO DISCUSS DISCHARGE PLANNING NEEDS. SW
DESCRIBED LEVEL OF CARE AND STATED PT COULDN'T BE ABLE TO PAY FOR ASSISTED
LIVING WITH INCOME REPORTED. MOISÉS STATED SHE WOULD DO APPROPRIATE PAPERWORK TO
SEE IF PT WILL QUALIFY FOR NURSING HOME PLACEMENT AND SEND REFERRALS TO
FACILITIES. JOSE VOICED UNDERSTANDING.

## 2019-07-31 NOTE — PN
PATIENT:MANDO NEIL                            MEDICAL RECORD: [id number]
                                                         LOCATION:[facility]
                                                         ADMISSION DATE: 07/26/19
 
PROGRESS NOTE
 
 
DATE OF SERVICE:  07/30/2019
 
SUBJECTIVE:  The patient's case was discussed with staff.  She has no new
complaint.
 
OBJECTIVE:  The patient has tolerated her initial dose of Aricept well.  She is
taking an antidepressant which I am in favor of.  She has had no further
psychotic symptoms here, but continues to insist that the  at the
[facility] is stealing from her.  This is certainly something that could be
accurate.  Apparently, the police and management of the facility have looked
into this multiple times and find no evidence of any theft.  By that I mean
nothing has been stolen and there is no evidence that the  has
been in there inappropriately.  The report I have is that this is delusional. 
The facility of [facility] is not going to allow her to come back there.  They
are evicting her.  The patient says this is just fine with her because she does
want to live anywhere where the staff steals from her and the management will
not do anything about it.  Her daughter, Afia, is in [place] and the patient
wants to go there and live either with her or close to her.  Assuming Afia is
okay with this, we will make arrangements for that to happen soon.
 
ASSESSMENT:  No change in diagnoses.
 
PLAN:  Current medicines have been reviewed and will be maintained.  Fairly
extensive supportive and educational interventions were made.
 
TRANSINT:[id number] Voice Confirmation ID: [id number] DOCUMENT ID: [id number]
 
 
 
 
                                           
                                           JAVI DAVIDSON MD             
 
 
 
Electronically Signed by JAVI DAVIDSON on 07/31/19 at 1532
 
 
 
 
 
 
 
 
CC:                                                             [id number]
DICTATION DATE: 07/30/19 1123     :     07/30/19 1136      ADM IN  
                                                                              
[facility]                                          
[address] [place] [address]

## 2019-07-31 NOTE — NUR
RECEIVED PT IN DINING ROOM FOR B'FAST, ALERT, CALM, COOPERATIVE, NO SI
IDEATIONS.  MEDS ADMIN PER ORDERS. COOPERATIVE WITH ALL ASPECTS OF PLAN OF
CARE.  CONT POC AS DIRECTED.

## 2019-08-01 VITALS — DIASTOLIC BLOOD PRESSURE: 85 MMHG | SYSTOLIC BLOOD PRESSURE: 121 MMHG

## 2019-08-01 VITALS — SYSTOLIC BLOOD PRESSURE: 113 MMHG | DIASTOLIC BLOOD PRESSURE: 60 MMHG

## 2019-08-01 NOTE — NUR
Team Treatment Review:
Diet: Regular Vegetarian Diet
PO Intake: 86% average per 9 meals
Wt: 135 lbs (-1 lb since 7/27)
Med: No new significant meds at this time
-Will provide supplements if PO <50% per meal
-Will Monitor Closely
-Clinical Dietitian Following

## 2019-08-01 NOTE — PN
PATIENT:MANDO NEIL                            MEDICAL RECORD: [id number]
                                                         LOCATION:[facility]
                                                         ADMISSION DATE: 07/26/19
 
PROGRESS NOTE
 
 
DATE OF SERVICE:  07/31/2019
 
SUBJECTIVE:  The patient's case was discussed with staff.  She has no new
complaint.
 
OBJECTIVE:  The patient denies intent to harm herself or others.  She generally
tolerates her medicines well.  She is continuing to have the delusions about her
apartment being burglarized.  She has not had any new delusions.
 
ASSESSMENT:  No change in diagnoses.
 
PLAN:  Apparently, the patient's daughter is wanting to move her to a facility
in [place], but the facility that she wants, which is near her, is full and
has a waiting list.  In the interim, she is going to place the patient in a
nursing home here and would like to get that settled out in the next few days as
the daughter is traveling here to make these arrangements.
 
TRANSINT:[id number] Voice Confirmation ID: [id number] DOCUMENT ID: [id number]
 
 
 
 
                                           
                                           JAVI DAVIDSON MD             
 
 
 
Electronically Signed by JAVI DAVIDSON on 08/01/19 at 1121
 
 
 
 
 
 
 
 
 
 
 
 
 
 
 
 
CC:                                                             [id number]
DICTATION DATE: 07/31/19 1629     :     07/31/19 2023      ADM IN  
                                                                              
[id number] [place] [address]

## 2019-08-01 NOTE — NUR
IS ALERT AND ORIENTED.COOPERATIVE WITH STAFF AND MEDS.AMBULATES WITH STEADY
GAIT.WILL CONTINUE [facility] PLAN OF CARE,MONITOR FOR CHANGES AND SAFETY.

## 2019-08-02 VITALS — DIASTOLIC BLOOD PRESSURE: 68 MMHG | SYSTOLIC BLOOD PRESSURE: 152 MMHG

## 2019-08-02 NOTE — NUR
PATIENT SITTING IN CHAIR AT TABLE. NO DISTRESS NOTED. SOME CONFUSION NOTED. PT
IS ORIENTED TO SELF, PLACE. PATIENT IS AMBULATORY. COMPLIANT WITH MEDS,
ASSESSMENT AND GROUPS. NO BEHAVIORS NOTED AT THIS TIME. WILL CONT PLAN OF
CARE.

## 2019-08-02 NOTE — NUR
B) Patient is alert and oriented to person, place and time, very paranoid and
missunderstands at times,
I) Administered scheduled medications as ordered, monitored for safety
R) Mediation compliant, sleeping quietly in her bed,
P) Continue plan of care.

## 2019-08-02 NOTE — PN
PATIENT:MANDO NEIL                            MEDICAL RECORD: [id number]
                                                         LOCATION:[facility]
                                                         ADMISSION DATE: 07/26/19
 
PROGRESS NOTE
 
 
DATE OF SERVICE:  08/01/2019
 
SUBJECTIVE:  The patient's case was discussed with staff.  She has no new
complaint.
 
OBJECTIVE:  The patient is in good behavioral control with limited insight about
her condition.  She has not been paranoid or delusional today.
 
ASSESSMENT:  No change in diagnoses.
 
PLAN:  The patient will be transitioned out of the hospital to the [facility] tomorrow.  Her long-term prognosis is guarded.
 
TRANSINT:[id number] Voice Confirmation ID: [id number] DOCUMENT ID: [id number]
 
 
 
 
                                           
                                           JAIV DAVIDSON MD             
 
 
 
Electronically Signed by JAVI DAVIDSON on 08/02/19 at 1514
 
 
 
 
 
 
 
 
 
 
 
 
 
 
 
 
 
 
 
 
CC:                                                             [id number]
DICTATION DATE: 08/01/19 1252     :     08/01/19 1312      DIS IN  
                                                                      08/02/19
[facility]                                          
[address] [place] [address]

## 2019-08-02 NOTE — NUR
PATIENT DISCHARGED TO [facility] VIA TRANPORT VAN. PAPEWORK AND BELONGINGS SENT
WITH PATIENT. PT STABLE AT TIME OF TRANSPORT. PAPERWORK SENT TP FACILITY AND
FAXED OVER A DIET TO NURSE. PT MED COMPLIANT AND VERY PLESANT.

## 2019-08-03 NOTE — PN
PATIENT:MANDO NEIL                            MEDICAL RECORD: [id number]
                                                         LOCATION:[facility]
                                                         ADMISSION DATE: 07/26/19
 
PROGRESS NOTE
 
 
DATE OF SERVICE:  08/02/2019
 
SUBJECTIVE:  The patient's case was discussed with staff.  She has no new
complaint.
 
OBJECTIVE:  The patient is in good behavioral control and has no thoughts of
harming herself or others.  There are no overt psychotic symptoms.
 
ASSESSMENT:  No change in diagnoses.
 
PLAN:  The patient will be transitioned out of the hospital today.  She is going
to go to a local nursing home.  Her daughter is planning to move her to
[place], but that cannot take place until the facility that the daughter
wants has an opening.
 
TRANSINT:[id number] Voice Confirmation ID: [id number] DOCUMENT ID: [id number]
 
 
 
 
                                           
                                           JAVI DAVIDSON MD             
 
 
 
Electronically Signed by JAVI DAVIDSON on 08/03/19 at 1116
 
 
 
 
 
 
 
 
 
 
 
 
 
 
 
 
 
 
CC:                                                             [id number]
DICTATION DATE: 08/02/19 1519     :     08/02/19 1526      DIS IN  
                                                                      08/02/19
[id number] [place] [address]

## 2019-08-06 ENCOUNTER — HOSPITAL ENCOUNTER (INPATIENT)
Dept: HOSPITAL 84 - D.PSYCH | Age: 79
LOS: 10 days | Discharge: NURSING, REHAB OR LONG-TERM CARE | DRG: 57 | End: 2019-08-16
Attending: PSYCHIATRY & NEUROLOGY | Admitting: PSYCHIATRY & NEUROLOGY
Payer: MEDICARE

## 2019-08-06 VITALS — DIASTOLIC BLOOD PRESSURE: 59 MMHG | SYSTOLIC BLOOD PRESSURE: 127 MMHG

## 2019-08-06 VITALS
BODY MASS INDEX: 24.04 KG/M2 | BODY MASS INDEX: 24.04 KG/M2 | HEIGHT: 63 IN | WEIGHT: 135.68 LBS | WEIGHT: 135.68 LBS | HEIGHT: 63 IN | BODY MASS INDEX: 24.04 KG/M2

## 2019-08-06 VITALS — SYSTOLIC BLOOD PRESSURE: 144 MMHG | DIASTOLIC BLOOD PRESSURE: 56 MMHG

## 2019-08-06 DIAGNOSIS — M19.90: ICD-10-CM

## 2019-08-06 DIAGNOSIS — E78.5: ICD-10-CM

## 2019-08-06 DIAGNOSIS — E03.9: ICD-10-CM

## 2019-08-06 DIAGNOSIS — M25.569: ICD-10-CM

## 2019-08-06 DIAGNOSIS — F02.81: ICD-10-CM
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
## 2019-08-06 DIAGNOSIS — F22: ICD-10-CM

## 2019-08-06 DIAGNOSIS — K59.00: ICD-10-CM

## 2019-08-06 DIAGNOSIS — G30.9: Primary | ICD-10-CM

## 2019-08-06 DIAGNOSIS — R45.851: ICD-10-CM

## 2019-08-06 DIAGNOSIS — F41.8: ICD-10-CM

## 2019-08-06 LAB
ALBUMIN SERPL-MCNC: 3.6 G/DL (ref 3.4–5)
ALP SERPL-CCNC: 101 U/L (ref 46–116)
ALT SERPL-CCNC: 13 U/L (ref 10–68)
ANION GAP SERPL CALC-SCNC: 14.6 MMOL/L (ref 8–16)
APPEARANCE UR: CLEAR
BASOPHILS NFR BLD AUTO: 0.6 % (ref 0–2)
BILIRUB SERPL-MCNC: 0.36 MG/DL (ref 0.2–1.3)
BILIRUB SERPL-MCNC: NEGATIVE MG/DL
BUN SERPL-MCNC: 12 MG/DL (ref 7–18)
CALCIUM SERPL-MCNC: 8.5 MG/DL (ref 8.5–10.1)
CHLORIDE SERPL-SCNC: 104 MMOL/L (ref 98–107)
CHOLEST/HDLC SERPL: 4.3 RATIO (ref 2.3–4.1)
CO2 SERPL-SCNC: 24.1 MMOL/L (ref 21–32)
COLOR UR: YELLOW
CREAT SERPL-MCNC: 0.7 MG/DL (ref 0.6–1.3)
EOSINOPHIL NFR BLD: 0.9 % (ref 0–7)
ERYTHROCYTE [DISTWIDTH] IN BLOOD BY AUTOMATED COUNT: 13.9 % (ref 11.5–14.5)
EST. AVERAGE GLUCOSE BLD GHB EST-MCNC: 114 MG/DL (ref 74–154)
GLOBULIN SER-MCNC: 2.8 G/L
GLUCOSE SERPL-MCNC: 121 MG/DL (ref 74–106)
GLUCOSE SERPL-MCNC: NEGATIVE MG/DL
HCT VFR BLD CALC: 37.6 % (ref 36–48)
HDLC SERPL-MCNC: 56 MG/DL (ref 32–96)
HGB BLD-MCNC: 12.7 G/DL (ref 12–16)
IMM GRANULOCYTES NFR BLD: 0.2 % (ref 0–5)
KETONES UR STRIP-MCNC: NEGATIVE MG/DL
LDL-HDL RATIO: 3.1 RATIO (ref 1.5–3.5)
LDLC SERPL-MCNC: 173 MG/DL (ref 0–100)
LYMPHOCYTES NFR BLD AUTO: 39.3 % (ref 15–50)
MCH RBC QN AUTO: 31.3 PG (ref 26–34)
MCHC RBC AUTO-ENTMCNC: 33.8 G/DL (ref 31–37)
MCV RBC: 92.6 FL (ref 80–100)
MONOCYTES NFR BLD: 7 % (ref 2–11)
NEUTROPHILS NFR BLD AUTO: 52 % (ref 40–80)
NITRITE UR-MCNC: NEGATIVE MG/ML
OSMOLALITY SERPL CALC.SUM OF ELEC: 278 MOSM/KG (ref 275–300)
PH UR STRIP: 8 [PH] (ref 5–6)
PLATELET # BLD: 215 10X3/UL (ref 130–400)
PMV BLD AUTO: 8.7 FL (ref 7.4–10.4)
POTASSIUM SERPL-SCNC: 3.7 MMOL/L (ref 3.5–5.1)
PROT SERPL-MCNC: 6.4 G/DL (ref 6.4–8.2)
PROT UR-MCNC: NEGATIVE MG/DL
RBC # BLD AUTO: 4.06 10X6/UL (ref 4–5.4)
SODIUM SERPL-SCNC: 139 MMOL/L (ref 136–145)
SP GR UR STRIP: 1 (ref 1–1.02)
TRIGL SERPL-MCNC: 57 MG/DL (ref 30–200)
TSH SERPL-ACNC: 0.16 UIU/ML (ref 0.36–3.74)
UROBILINOGEN UR-MCNC: NORMAL MG/DL
WBC # BLD AUTO: 5.3 10X3/UL (ref 4.8–10.8)

## 2019-08-06 NOTE — NUR
RECEIVED IN DAYROOM. SITTING IN A CHAIR WITH PEERS AT HER SIDE. CALM AND
COOPERATIVE WITH CARE AND ASSESSMENT. DENIES THOUGHT OF SELF HARM.ENCOURAGE TO
EXPRESS NEEDS. RESTING IN BED WITH EYES OPEN AT THIS TIME. CONTINUE P[MAR OF
CARE

## 2019-08-06 NOTE — NUR
PT ADMITTED TO [facility] FOR SI. PT REPORTED TO NURSING HOME NURSE " SHE CUT
HER LEFT WRIST WITH PAIR OF SCRISSORS, DUE TO SHE DOES NOT HAVE A REASON TO
LIVE." PT DENIES SI AT THIS TIME. SCRATCH NOTED TO PT LEFT WRIST. PT IS DNR.
PT CODEWORD IS VELBA.

## 2019-08-07 VITALS — DIASTOLIC BLOOD PRESSURE: 67 MMHG | SYSTOLIC BLOOD PRESSURE: 116 MMHG

## 2019-08-07 VITALS — SYSTOLIC BLOOD PRESSURE: 120 MMHG | DIASTOLIC BLOOD PRESSURE: 60 MMHG

## 2019-08-07 NOTE — NUR
B) Patient is alert and oriented to person and place, attention seeking, and
intrusive toward staff,
I) Administered scheduled medications as ordered, monitored for safety
R) mediation compliant, social with staff
P) Continue plan of care.

## 2019-08-07 NOTE — NUR
PATIENT AWAKE AND ALERT.  CALM AND COOPERATIVE WITH CARE AND ASSESSMEMT.
DENIES ANY THOUGHTS OF SUICIDE.  MEDICATION COMPLIANT.  REDIRECT AND REORIENT
AS NEEDED.  WILL CONTINUE PLAN OF CARE.

## 2019-08-08 VITALS — DIASTOLIC BLOOD PRESSURE: 65 MMHG | SYSTOLIC BLOOD PRESSURE: 142 MMHG

## 2019-08-08 NOTE — NUR
RECEIVED PATIENT IN DINING ROOM FOR B'FAST, ALERT, CALM, COOPERATIVE, DENIES
SUICIDAL IDEATIONS.  STATES THAT SHE IS AFRAID THAT SHE IS GETTING DEMENTIA
BECAUSE SHE HAS "SPELLS THAT MAKE ME DO CRAZY THINGS LIKE CUT MY ARM."  1 CM
INCISION NOTED TO LEFT ARM WHICH PATIENT STATES WAS SELF-INFLICTED.  PATIENT
CONCERNED AS TO WHERE SHE WILL DISCHARGE TO.
 
MEDS ADMIN PER ORDERS WITH COMPLETE MED COMPLIANCE NOTED.  COOPERATIVE WITH
GROUP THERAPY AND STAFF REQUESTS.  CONT POC AS DIRECTED.

## 2019-08-08 NOTE — PSY
PATIENT NAME:MANDO ENIL                                MEDICAL RECORD: [id number]
: 36                                              LOCATION:[facility]
ADMISSION DATE: 19    ACCOUNT: [id number]
                                                           
PSYCHIATRIC EVALUATION
 
 
DATE OF EVALUATION: 19
 
 
PSYCHIATRIC EVALUATION
 
IDENTIFYING DATA:  The patient is 78 years old and she is admitted to the
hospital on a voluntary basis secondary to suicidal threats.
 
CHIEF COMPLAINT:  "I just did something stupid."
 
HISTORY OF PRESENT ILLNESS:  The patient is known to me from at least 2 previous
hospitalizations.  On this occasion, she had been placed recently at the
[facility].  Her daughter had been here for a week to visit with
her.  The daughter left to go back to [place].  The daughter is still
intending to bring her to [place] at some point.  The patient felt abandoned
that no one cared about her.  She made a very small superficial scratch on her
left wrist.  It is almost imperceptible, perhaps a quarter of an inch long, and
no deeper than just below the epidermis.  There is no bandage.  There is no
stapling, suturing, or Steri-Stripping.  It is not even covered with a Band-Aid
and does not need to be.  She says that she did this because she briefly wanted
to kill herself.  She says it was impulsive.  She endorses a lot of depressive
symptoms, but wants to go back to the nursing home this afternoon.
 
PAST MEDICAL HISTORY:  Significant for hypothyroidism and osteoarthritis.
 
PAST PSYCHIATRIC HISTORY:  Significant for an established diagnosis of dementia
and the patient was hospitalized here in March of this year, in July of this
year, and now here in early August.
 
FAMILY HISTORY:  Noncontributory.
 
SOCIAL HISTORY:  The patient is .  She has 2 children who live in
[place]. The patient lives in a nursing home currently.  She had been living
in a low income elderly apartment building, but was delusional and accusing
people of stealing from her repeatedly even though there were multiple
investigations and there was no evidence of anything missing.  She denies a
history of drug or alcohol use, which I think is probably misleading since she
claims that, in the , she joined a hippie commune.  She is a former
cigarette smoker.  She worked as a seamstress.
 
MENTAL STATUS EXAMINATION:  The patient is awake, alert, and oriented to person
and place as well as somewhat to time and situation.  Her mood is depressed. 
Her affect is generally appropriate.  Thought processes are reasonably well
connected.  Memory, concentration, and abstraction abilities are impaired and
she denies any current intent to harm herself or others as well as any overt
psychotic symptoms.
 
ASSETS:  Supportive family members.
 
LIABILITIES:  Limited insight.
 
 
DIAGNOSTIC IMPRESSION:
AXIS I:
1.  Major neurocognitive disorder of the Alzheimer's type with behavioral
disturbances.
2.  Rule out major depression.
AXIS II:  Deferred.
AXIS III:  Osteoarthritis and hypothyroidism.
AXIS IV:  Moderate.
AXIS V:  Global assessment of functioning is 30.
 
PLAN:  At this time, the patient is hospitalized secondary to making a very
superficial scratch on her left wrist.  She made suicidal statements at the
nursing home such that they were concerned enough to send her here by ambulance.
 She has been examined and evaluated.  There is little that has changed since
she was discharged.  Perhaps she is significantly depressed.  I will certainly
treat her with an antidepressant.  My overall impression is one of a demented
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
seeking, help rejecting behavior consistent with that; but unfortunately her
dementia prevents her from being much of a candidate for long-term
psychotherapy.  At this point, I am going to maintain her on current medications
and certainly she needs to be here for at least a few days for observation given
what she said at the nursing home about wanting to kill herself as opposed to
what she did, which was relatively minor; but again I think she is unpredictable
and needs to be evaluated for the time being.
 
TRANSINT:[id number] Voice Confirmation ID: [id number] DOCUMENT ID: [id number]
                                           
                                           JAVI DAVIDSON MD             
 
 
 
Electronically Signed by JAVI DAVIDSON on 19 at 1514
 
 
 
 
 
 
 
 
 
 
 
 
 
 
 
 
 
 
 
 
CC:                                                             [id number]
DICTATION DATE: 19 1706     :     19 1818      ADM IN  
                                                                              
[facility]                                          
[address] [place] [address]

## 2019-08-09 VITALS — SYSTOLIC BLOOD PRESSURE: 97 MMHG | DIASTOLIC BLOOD PRESSURE: 53 MMHG

## 2019-08-09 VITALS — DIASTOLIC BLOOD PRESSURE: 63 MMHG | SYSTOLIC BLOOD PRESSURE: 115 MMHG

## 2019-08-09 NOTE — PN
PATIENT:MANDO NEIL                            MEDICAL RECORD: [id number]
                                                         LOCATION:[facility]
                                                         ADMISSION DATE: 08/06/19
 
PROGRESS NOTE
 
 
DATE OF SERVICE:  08/08/2019
 
SUBJECTIVE:  The patient's case was discussed with staff.  She has no new
complaint.
 
OBJECTIVE:  The patient is eating marginally well.  She again denies that she
would seek to harm herself and attributes the entire incident at the nursing
home to some sort of misunderstanding that is only partially her fault.
 
ASSESSMENT:  No change in diagnoses.
 
PLAN:  Supportive and educational interventions were made.  Long-term prognosis
is guarded.
 
TRANSINT:[id number] Voice Confirmation ID: [id number] DOCUMENT ID: [id number]
 
 
 
 
                                           
                                           JAVI DAVIDSON MD             
 
 
 
Electronically Signed by JAVI DAVIDSON on 08/09/19 at 1629
 
 
 
 
 
 
 
 
 
 
 
 
 
 
 
 
 
 
 
CC:                                                             [id number]
DICTATION DATE: 08/09/19 1500     :     08/09/19 1610      ADM IN  
                                                                              
[address] [place] [address]

## 2019-08-09 NOTE — NUR
PT SITTING AT BREAKFAST TABLE EATING AND SOCIALIZING WITH PEERS. RESP EVEN AND
NONLABORED. NO ACUTE DISTRESS NOTED. PT IS MED COMPLIANT. NO BEHAVIORS NOTED.
PT COMPLIANT WITH ASSESSMENT, MEDS AND VITAL SIGNS. PT AMBULATES PER SELF.
WILL CONT TO MONITOR.

## 2019-08-10 VITALS — DIASTOLIC BLOOD PRESSURE: 66 MMHG | SYSTOLIC BLOOD PRESSURE: 128 MMHG

## 2019-08-10 NOTE — NUR
ORIENTED TO SELF,MONTH AND YEAR.DENIES SUICIDAL THOUGHTS.IS AMBULATORY WITH A
STEADY GAIT.COMPLIANT WITH STAFF AND MEDS.INTERACTS WITH PEERS AND ASSIST THEM
WHEN SHE CAN.WILL CONTINUE WITH CURRENT PLAN OF CARE,MONITOR FOR CHANGES AND
SAFETY.

## 2019-08-10 NOTE — PN
PATIENT:MANDO NEIL                            MEDICAL RECORD: [id number]
                                                         LOCATION:[facility]
                                                         ADMISSION DATE: 08/06/19
 
PROGRESS NOTE
 
 
DATE OF SERVICE:  08/09/2019
 
SUBJECTIVE:  The patient's case was discussed with staff.  She has no new
complaint.
 
OBJECTIVE:  The patient denies that she would seek to harm herself or others. 
She generally is tolerating her medicines well.  She has been quite anxious.
 
ASSESSMENT:  No change in diagnoses.
 
PLAN:  Supportive and educational interventions were made.  I am going to start
the patient on a low dose of Klonopin.
 
TRANSINT:[id number] Voice Confirmation ID: [id number] DOCUMENT ID: [id number]
 
 
 
 
                                           
                                           JAVI DAVIDSON MD             
 
 
 
Electronically Signed by JAVI DAVIDSON on 08/10/19 at 0930
 
 
 
 
 
 
 
 
 
 
 
 
 
 
 
 
 
 
 
 
CC:                                                             [id number]
DICTATION DATE: 08/09/19 1716     :     08/09/19 2245      ADM IN  
                                                                              
[place] [address]

## 2019-08-11 VITALS — SYSTOLIC BLOOD PRESSURE: 125 MMHG | DIASTOLIC BLOOD PRESSURE: 68 MMHG

## 2019-08-11 VITALS — DIASTOLIC BLOOD PRESSURE: 42 MMHG | SYSTOLIC BLOOD PRESSURE: 90 MMHG

## 2019-08-11 VITALS — SYSTOLIC BLOOD PRESSURE: 142 MMHG | DIASTOLIC BLOOD PRESSURE: 50 MMHG

## 2019-08-11 NOTE — PN
PATIENT:MANDO NEIL                            MEDICAL RECORD: [id number]
                                                         LOCATION:[facility]
                                                         ADMISSION DATE: 08/06/19
 
PROGRESS NOTE
 
 
DATE OF SERVICE:  08/10/2019
 
SUBJECTIVE:  The patient's case was discussed with staff.  She has no new
complaint.
 
OBJECTIVE:  The patient is in good behavioral control with limited insight about
her condition.  She is tolerating her medications well.
 
ASSESSMENT:  No change in diagnoses.
 
PLAN:  Supportive and educational interventions were made.  Long-term prognosis
is guarded.
 
TRANSINT:[id number] Voice Confirmation ID: [id number] DOCUMENT ID: [id number]
 
 
 
 
                                           
                                           JAVI DAVIDSON MD             
 
 
 
Electronically Signed by JAVI DAVIDSON on 08/11/19 at 1049
 
 
 
 
 
 
 
 
 
 
 
 
 
 
 
 
 
 
 
 
CC:                                                             [id number]
DICTATION DATE: 08/10/19 1150     :     08/10/19 1407      ADM IN  
                                                                              
[address] [address]

## 2019-08-11 NOTE — NUR
PATEINT SITTING IN CHAIR IN DAY AREA. NO ACUTE DISTRESS NOTED. CONFUSION
NOTED. PT IS ORIENTED TO PERSON, PLACE. PT IS COMPLIANT WITH MEDS,
ASSESSMENTS, AND VITALS. NO BEHAVIOR NOTED. NO SI NOTED. PT IS PLESANT AT
TIMES WITH STAFF AND PEERS. AMBULATES. WILL CONT PLAN OF CARE.

## 2019-08-12 VITALS — DIASTOLIC BLOOD PRESSURE: 65 MMHG | SYSTOLIC BLOOD PRESSURE: 130 MMHG

## 2019-08-12 VITALS — DIASTOLIC BLOOD PRESSURE: 57 MMHG | SYSTOLIC BLOOD PRESSURE: 108 MMHG

## 2019-08-12 NOTE — NUR
PT IS AWAKE AND ALERT. PT IS CALM AND COOPERATIVE WITH ASSESSMENT. MED
COMPLIANT. REDIRECT AND REORIENT AS NEEDED. FALL PRECAUTIONS IN PLACE. WILL
CPOC.

## 2019-08-12 NOTE — NUR
B.) PT IS ALERT AND ORIENTED TO SELF, SITUATION AND PLACE. SHE IS PLEASANT AND
HELPFUL WITH STAFF AND PEERS.
I.) PROVIDED PM MEDICATIONS AND REDIRECT AS NEEDED.
R.) COMPLIANT WITH ALL MEDICATIONS
P.) CONTINUE PLAN OF CARE.

## 2019-08-12 NOTE — NUR
RECEIVED IN DAYROOM. SITTING IN A CHAIR WITH PEERS AT HER SIDE. CALM AND
COOPERATIVE WITH CARE AND ASSESSMENT. NO STATEMENT OF SELF HARM MADE THIS PM.
ENCOURAGE TO VOICE NEEDS. RESTING IN BED WITH EYES CLOSED. CONTINUE PLAN OF
CARE

## 2019-08-12 NOTE — PN
PATIENT:MANDO NEIL                            MEDICAL RECORD: [id number]
                                                         LOCATION:[facility]
                                                         ADMISSION DATE: 08/06/19
 
PROGRESS NOTE
 
 
DATE OF SERVICE:  08/11/2019
 
SUBJECTIVE:  The patient's case was discussed with staff.  She has no new
complaint.
 
OBJECTIVE:  The patient has no thoughts of self-harm.  Her mood is euthymic. 
She is anxious to be discharged.
 
ASSESSMENT:  No change in diagnoses.
 
PLAN:  Current medicines have been reviewed and will be maintained.  Long-term
prognosis is guarded.
 
TRANSINT:[id number] Voice Confirmation ID: [id number] DOCUMENT ID: [id number]
 
 
 
 
                                           
                                           JAVI DAVIDSON MD             
 
 
 
Electronically Signed by JAVI DAVIDSON on 08/12/19 at 1647
 
 
 
 
 
 
 
 
 
 
 
 
 
 
 
 
 
 
 
 
CC:                                                             [id number]
DICTATION DATE: 08/11/19 1207     :     08/11/19 1527      ADM IN  
                                                                              
[address] [place] [address]

## 2019-08-12 NOTE — NUR
MOISÉS SPOKE WITH PT'S DTR, JOSE, TO DISCUSS DISCHARGE PLANNING NEEDS. SHE STATED
SHE WANTED A REFERRAL TO BE SENT TO [facility]. SHE STATED PT STATED SHE
HAS NOT HAD ANYONE DISCUSS TO DISEASE WITH HER AND THE SIDE EFFECTS. MOISÉS STATED
LAST ADMIT PT WAS TOLD BY MOISÉS AND MD THAT SHE COULD NOT LIVE BY HERSELF DUE TO
HER DEMENTIA PROGRESSING AND NEEDING MORE MONITORING. MOISÉS STATED WHEN PT IS BY
HERSELF SHE BECOMES NON COMPLIANT WITH MEDICATION REGIMEN AND DOES NOT TAKE
CARE OF HERSELF. MOISÉS REPORTED PT WAS PLACED ON A NEW MEDICATION ON FRIDAY WHEN
DTR ASKED. MOISÉS STATED SHE CAN CALL NURSING STAFF OR BE TRANSFERED TO GO OVER
THE MEDICATION SCHEDULE. SHE STATED SHE WOULD DO IT LATER TONIGHT. JOSE
VERBALIZED UNDERSTANDING OF DISCUSSION AND EXPRESSED NO OTHER NEEDS AT THIS
TIME.

## 2019-08-13 VITALS — SYSTOLIC BLOOD PRESSURE: 128 MMHG | DIASTOLIC BLOOD PRESSURE: 59 MMHG

## 2019-08-13 VITALS — SYSTOLIC BLOOD PRESSURE: 116 MMHG | DIASTOLIC BLOOD PRESSURE: 60 MMHG

## 2019-08-13 NOTE — NUR
Nutrition Follow-up:
Diet: Regular vegetarian
PO intake: 76% average x last 9 meals/3 days
Last BM 8/9/19
Significant meds: linzess
no new labs
Wt: 136# (8/11/19)- wt trend stable
RD Following

## 2019-08-13 NOTE — NUR
PATIENT FALL IN BEDROO. PT STATED SHE WAS WATCHING SUNSET OUT HER WIND WHILE
LAYING IN BED AND ROLLED OUT OF BED. ROLLED OUT AGAINST NIGHTSTAND WHICH LEFT
AN ABRASION ON THE LEFT SIDE OF HER CHEST. DAUGHTER "Jere" CALLED AND
INFORRMED OF FALL. DOCTOR TUCKER CALLED AND INFORMED OF FALL. NO NEW ORDERS
RECEIVED. HOUSE SUPER NOTIFIED OF FALL. CONTINUE TO MONITOR PATIENT.

## 2019-08-14 VITALS — DIASTOLIC BLOOD PRESSURE: 64 MMHG | SYSTOLIC BLOOD PRESSURE: 130 MMHG

## 2019-08-14 VITALS — SYSTOLIC BLOOD PRESSURE: 112 MMHG | DIASTOLIC BLOOD PRESSURE: 53 MMHG

## 2019-08-14 NOTE — PN
PATIENT:MANDO NEIL                            MEDICAL RECORD: [id number]
                                                         LOCATION:[facility]
                                                         ADMISSION DATE: 08/06/19
 
PROGRESS NOTE
 
 
DATE OF SERVICE:  08/13/2019
 
SUBJECTIVE:  The patient's case was discussed with staff.  She has no new
complaint.
 
OBJECTIVE:  The patient is in good behavioral control with limited insight about
her condition.  She is tolerating her medicines well.  Eye contact is poor. 
Concentration is fair.
 
ASSESSMENT:  No change in diagnoses.
 
PLAN:  The patient is stable and can probably be transitioned out of the
hospital tomorrow if the finalized approval from the nursing home is received.
 
TRANSINT:[id number] Voice Confirmation ID: [id number] DOCUMENT ID: [id number]
 
 
 
 
                                           
                                           JAVI DAVIDSON MD             
 
 
 
Electronically Signed by JAVI DAVIDSON on 08/14/19 at 1455
 
 
 
 
 
 
 
 
 
 
 
 
 
 
 
 
 
 
 
CC:                                                             [id number]
DICTATION DATE: 08/13/19 1521     :     08/13/19 1620      ADM IN  
                                                                              
[address] [place] [address]

## 2019-08-14 NOTE — NUR
REC'D PT SITTING IN THE DAYROOM. PLEASANT AND COOPERATIVE. INTERACTS WITH
OTHERS. ABRASION TO LEFT SIDE FROM ROLLING OUT OF BED THE NIGHT PREVIOUS WHILE
WATCHING THE SUN SET. CONFUSED AT TIMES AND RELATES "I HAVE ALZHEIMERS,"
PATIENT INFLICTED A SMALL SCRATCH TO HER WRIST AND THEN REALIZED THAT WAS NOT
THE SOLUTION TO HER PROBLEM, ADMINISTER MEDS PER ORDERS Q SHIFT AND MONITOR
COMPLIANCE. OBTAIN VERBAL NO HARM CONTRACT Q SHIFT. MED COMPLIANT. CONTRACTS
VERBALLY FOR NO SELF HARM. CONTINUE POC AND PROVIDE SAFE ENVIRONMENT.

## 2019-08-14 NOTE — NUR
RECEIVED PATIENT IN DINING ROOM FOR B'FAST, ALERT, CALM, COOPERATIVE,
DEMANDING AT TIMES.  MEDS ADMIN PER ORDERS WITH COMPLETE MED COMPLIANCE NOTED.
COOPERATIVE WITH GROUP.  CONT POC INCLUDING MEDS AND GROUP THERAPY AS
DIRECTED.

## 2019-08-14 NOTE — NUR
RECEIVED IN DAYROOM. WALKING ABOUT. SOCIALIZING WITH STAFF AND PEERS. CALM AND
COOPERATIVE WITH CARE AND ASSESSMENT. DENIES THOUGHTS OF SELF HARM. ENCOURAGE
TO EXPRESS NEEDS. RESTING IN BED WITH EYES CLOSED AT THIS TIME. CONTINUE PLAN
OF CARE

## 2019-08-15 VITALS — SYSTOLIC BLOOD PRESSURE: 130 MMHG | DIASTOLIC BLOOD PRESSURE: 60 MMHG

## 2019-08-15 VITALS — DIASTOLIC BLOOD PRESSURE: 79 MMHG | SYSTOLIC BLOOD PRESSURE: 134 MMHG

## 2019-08-15 LAB
T4 SERPL-MCNC: 6.1 UG/DL (ref 4.7–13.3)
TSH SERPL-ACNC: 3.19 UIU/ML (ref 0.36–3.74)

## 2019-08-15 NOTE — NUR
PT WAS YELLING. UPON ENTERING ROOM PATIENT WAS SITTING IN THE FLOOR AND
RELATED SHE FELL OUT OF BED. DENIES PAIN. SKIN TEAR NOTED TO LEFT ARM.
ASSISTED BACK TO BED BUT THEN NEEDED TO GO TO BATHROMM. ASKED IF SHE WAS GOING
TO THE BATHROOM WHEN SHE FELL AND PATIENT RELATED "NO."  EARLIER IN THE
EVENING PATIENT HAD ASKED THE [facility] WHAT HAPPENS IF A PATIENT FALLS.

## 2019-08-15 NOTE — NUR
PATIENT DAUGHTER CALLED TO CHECK ON HER MOTHER NIGHT AND MEDICATIONS. DAUGHTER
C/O THAT WHEN SHE SPOKE TO HER MOTHER SHE SOUNDED "LIKE SHE WAS ON A CRUISE
SHIP DECK AND WAS VERY DRUNK" DAUGHTER ALSO STATED THAT WHEN VISITORS CAME TO
SEE HER THAT THEY SAID SHE WAS SEDATED. THIS NURSE EXPLAINED THAT STAFF WAS
NOT SEEING DROWSINESS AND THAT I WOULD SPEAK TO THE DOCTOR IN REGARDS TO ANY
MEDICATION THAT COULD MAKE HER DROWSY. DAUGHTER C/O THAT SHE WAS NOT ON
SYNTHROID WHICH SHE HAD BEEN TAKING FOR YEARS. THIS NURSE REVIEWED LAB AND
SPOKE WITH JACOB MINAYA. NEW ORDER TO REPEAT TSH, T3 AND T4 LAB TO COMPARE TO
EXISTING LAB. ORDER IN THE COMPUTER AND DAUGHTER AWARE. THIS NURSE EXPLAINED
WE CONTINUE THE MEDICATIONS SENT FROM THE FACILITY. WILL CONT PLAN OF CARE.

## 2019-08-15 NOTE — NUR
DAUGHTER CALLED CONCERNED THAT PATIENT STATED SHE WAS IN ALOT OF PAIN. NURSE
WAS UNAWARE THAT PATIENT WAS IN PAIN. TOLD PT SHE HAD TO TELL NURSING STAFF
ABOUT BEING IN PAIN. SHE VERBALIZIED UNDERSTANDING. NURSE APPLIED SOME CREAM
TO CHEST. NURSE TOLD DAUGHTER SHE WOULD CALL DOCTOR ABOUT PAIN AND MAYBE A
X-RAY.

## 2019-08-15 NOTE — NUR
FAMILY NOTIFIED THAT MEDICATION WAS DECREASED DUE TO DROWINESS. PT WAS
THRILLED TO HEAR THAT NEWS. PATIENT IS COMPLIANT WITH MEDS, ASSESSMENTS, AND
VITALS. AMBULATES WITH SELF. NO BEHAVIORS NOTED. WILL CONT PLAN OF CARE.

## 2019-08-15 NOTE — NUR
SPOKE WITH DR TUCKER MALDONADOING PATIENT'S FALL WITH NO C/O FROM PATIENT AND SKIN
TEAR NOTED TO LEFT FOREARM. NO ORDERS RECEIVED. UNABLE TO RECORD IN CRESCENCIO PA Wedron SUPERVISOR MADE AWARE OF THIS.

## 2019-08-15 NOTE — NUR
SPOKE WITH DR. CEBALLOS. NEW ORDER FOR LIDODERM 5% PATCH ONCE DAILY FOR PAIN. WILL
CONT PLAN OF CARE.

## 2019-08-15 NOTE — PN
PATIENT:MANDO NEIL                            MEDICAL RECORD: [id number]
                                                         LOCATION:[facility]
                                                         ADMISSION DATE: 08/06/19
 
PROGRESS NOTE
 
 
DATE OF SERVICE:  08/14/2019
 
SUBJECTIVE:  The patient's case was discussed with staff.  She has no new
complaint.
 
OBJECTIVE:  The patient is in good behavioral control with limited insight about
her condition.  She tolerates her medicines well.
 
ASSESSMENT:  Vascular dementia.
 
PLAN:  The patient should be discharged at any time.  There are some paperwork
issues that have to be completed by the nursing home and the patient's daughter,
but once those are done, I am ready for her to go either this afternoon or in
the morning and there is no clinical reason she cannot be discharged.
 
TRANSINT:[id number] Voice Confirmation ID: [id number] DOCUMENT ID: [id number]
 
 
 
 
                                           
                                           JAVI DAVIDSON MD             
 
 
 
Electronically Signed by JAVI DAVIDSON on 08/15/19 at 1317
 
 
 
 
 
 
 
 
 
 
 
 
 
 
 
 
 
 
CC:                                                             [id number]
DICTATION DATE: 08/14/19 1536     :     08/14/19 1824      ADM IN  
                                                                              
[id number] [place] [address]

## 2019-08-15 NOTE — NUR
NUTRITION F/U
PT TOLERATING REG DIET WITH 100% INTAKE RECENT MEALS. ONLY SLIGHT WT
DIFFERENCE SINCE ADMIT. WILL CONTINUE TO PROVIDE DIET, MONITOR PO INTAKE AND
WT.
RD FOLLOWING

## 2019-08-15 NOTE — NUR
DAUGHTER REQUESTED PATIENT START BACK ON THE DICLOFENAC SODIUM TOPICAL. NURSE
TOLD DAUGHTER SHE WOULD BRING IT UP TO DOCTOR BEFORE DISCHARGE TOMORROW.
PATIENT HAD ASKED FOR IT TO BE TAKEN OFF DUE TO HER KNEES NOT HURTING ANYMORE.

## 2019-08-15 NOTE — NUR
REC'D SITTING IN DAYROOM. APPROPRIATE WHEN APPROACHED. RELATES REASON FOR
ADMISSION WAS "ALITTLE OFF CENTER." DOES NOT REMEMBER ROLLING OUT OF BED THE
NIGHT BEFORE. PT RELATES THE BOTTOM DRAWER ON BEDSIDE TABLE WAS PULLED OUT AND
SHE WAS REACHING FOR SOMETHING AND FELL ON THE DRAWER. ADMINISTER MEDS PER
ORDERS Q SHIFT AND MONITOR COMPLIANCE. REORIENT AS NEEDED. MED COMPLIANT. PT
IS FORGETFUL REQUIRING FREQUENT REORIENTATION. CONTINUE POC AND PROVIDE SAFE
ENVIRONMENT.

## 2019-08-16 VITALS — DIASTOLIC BLOOD PRESSURE: 47 MMHG | SYSTOLIC BLOOD PRESSURE: 105 MMHG

## 2019-08-16 NOTE — NUR
[facility] HERE TO  PATIENT. BELONGINGS GIVEN TO TRANSPORT.
PATIENT STABLE AT TIME OF DISCHARGE. PAPERWORK GIVEN TO TRANSPORT.

## 2019-08-16 NOTE — NUR
SPOKE WITH PATIENT DAUGHTER JOSE JIMENEZ. INQUIRED ABOUT HOW PATIENT NIGHT
WENT AND MEDICATIONS CHANGES. DAUGHTER SAID SHE LEFT A NOTE FOR DR. CEBALLOS TO
CHANGE KLONOPIN FROM 0.25 MG BID TO DAILY. THIS NURSE EXPLAINED THAT DR. DAVIDSON WOULD BE THE DOCTOR TO CHANGE THAT MEDICATION DOSE BUT SHE WAS IN THE
MIDDLE OF DISCHARGING AT THIS TIME. SHE ASKED IF SHE COULD CALL THE NURSING
HOME DOCTOR AND CHANGING THE MEDICATIONS. THIS NURSE SUGGESTED SHE COULD CALL
THE NURSING HOME AND SPEAK WITH THEM ABOUT WHICH MEDICATIONS WILL BE
CONTINUED. VERBALAIZED UNDERSTANDING.

## 2019-08-16 NOTE — NUR
CALLED [facility] IN ATTEMPT TO REACH  DUE TO PATIENT BELONGING
BAG LEFT ON UNIT. NURSE BASSAM SHE WOULD SEE WHAT SHE COULD DO. AWAITING
CALLBACK.

## 2019-08-16 NOTE — NUR
PATIENT SITTING AT BREAKFAST TABLE. RESP EVEN AND NONLABORED. NO ACUTE
DISTRESS NOTED. PT AMBULATES. PT IS ORIENTED TO SELF AND ALERT. PT C/O OF
SORENESS TO SIDE FROM PREVIOUS FALL ON 08/15/19. LIDOCAINE PATCH APPLIED. PT
STATED "THANK YOU." PT COMPLIANT WITH MEDS, ASSESSMENTS AND VITALS. PT
TOLERATING DECREASE IN MEDICAITON TO 0.25 MG KLONOPIN. WILL CONT PLAN OF CARE.

## 2019-08-16 NOTE — PN
PATIENT:MANDO NEIL                            MEDICAL RECORD: [id number]
                                                         LOCATION:[facility]
                                                         ADMISSION DATE: 08/06/19
 
PROGRESS NOTE
 
 
DATE OF SERVICE:  08/15/2019
 
SUBJECTIVE:  The patient's case was discussed with staff.  She has no new
complaint.
 
OBJECTIVE:  The patient was somewhat sedated today.  She has pretty limited
insight about her situation.
 
Unfortunately, there is still some bureaucratic paperwork that needs to be
completed before she can go to the nursing home.  I am told that that should be
resolved by tomorrow.  I am going to reduce her Klonopin secondary to what I
think is little bit of excessive sedation.
 
TRANSINT:[id number] Voice Confirmation ID: [id number] DOCUMENT ID: [id number]
 
 
 
 
                                           
                                           JAVI DAVIDSON MD             
 
 
 
Electronically Signed by JAVI DAVIDSON on 08/16/19 at 1542
 
 
 
 
 
 
 
 
 
 
 
 
 
 
 
 
 
 
 
 
CC:                                                             [id number]
DICTATION DATE: 08/15/19 1505     :     08/15/19 1801      DIS IN  
                                                                      08/16/19
[address] [place] [address]

## 2019-08-20 NOTE — DS
PATIENT:MANDO NEIL                    :36   MEDICAL RECORD: [id number]
 
                              DISCHARGE SUMMARY
                                                         
ADMISSION DATE:    19                       DISCHARGE DATE:     19
 
 
IDENTIFYING DATA:  The patient is 78 years old and she is admitted to the
hospital on a voluntary basis secondary to suicidal threats.  The patient was
recently discharged from here after an extended stay.  She went to a nursing
home.  She is upset about her delay and plans to be reunited with her daughter
in [place].  Made a very superficial scratch on the inside of her left wrist
and told the staff at the nursing home she was going to kill herself. 
Immediately upon arrival at the hospital, she began back tracking from those
statements.
 
HOSPITAL COURSE:  The patient was admitted and fully evaluated from both a
medical, psychological, and social standpoint.  She has an established diagnosis
of dementia and depression.  She was treated with antidepressant medications and
memory enhancing medications and referred back to the nursing home.  It is my
opinion that what she did is primarily related to an underlying personality
disorder and that it was an attention seeking gesture that had no serious life
threatening implications.  She is demented and that reduces her ability to
adequately in a healthy way deal with acute psychosocial stressors, and in
addition to this she has somewhat something of a dramatic and histrionic
personality, which leads her to do things that are somewhat hyperbolic and then
when she gets the appropriate attention she backs away and says that she did not
mean it.  I think it is a lifelong pattern of maladaptive behavior that is
unlikely to dramatically change.
 
DISCHARGE DIAGNOSES:
AXIS I:  Major neurocognitive disorder of the Alzheimer's type with behavioral
disturbances.  Major depression, mild.
AXIS II:  Cluster B personality traits.
AXIS III:  Osteoarthritis, hypothyroidism.
AXIS IV:  Moderate.
AXIS V:  Global assessment of functioning is 35.
 
PLAN:  At the time of discharge, the patient had no active thoughts of harming
herself or others.  She was tolerating her medications well.  Her long-term
prognosis is guarded.
 
TRANSINT:[id number] Voice Confirmation ID: [id number] DOCUMENT ID: [id number]
                                           
                                           JAVI DAVIDSON MD             
 
 
 
Electronically Signed by JAVI DAVIDSON on 19 at 1533
 
CC:                                                             [id number]
DICTATION DATE: 19 1538     :     19 2312      DIS IN  
                                                                      19
[id number] [place] [address]

## 2020-05-13 ENCOUNTER — HOSPITAL ENCOUNTER (EMERGENCY)
Dept: HOSPITAL 84 - D.ER | Age: 80
Discharge: OTHER INSTITUTION | End: 2020-05-13
Payer: MEDICARE

## 2020-05-13 VITALS — WEIGHT: 137.29 LBS | HEIGHT: 61 IN | BODY MASS INDEX: 25.92 KG/M2

## 2020-05-13 VITALS — DIASTOLIC BLOOD PRESSURE: 63 MMHG | SYSTOLIC BLOOD PRESSURE: 148 MMHG

## 2020-05-13 DIAGNOSIS — S32.402A: Primary | ICD-10-CM

## 2020-05-13 DIAGNOSIS — E87.6: ICD-10-CM

## 2020-05-13 DIAGNOSIS — E07.9: ICD-10-CM
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
## 2020-05-13 LAB
ALBUMIN SERPL-MCNC: 3.3 G/DL (ref 3.4–5)
ALP SERPL-CCNC: 76 U/L (ref 30–120)
ALT SERPL-CCNC: 20 U/L (ref 10–68)
ANION GAP SERPL CALC-SCNC: 10.7 MMOL/L (ref 8–16)
APTT BLD: 43 SECONDS (ref 22.8–39.4)
BASOPHILS NFR BLD AUTO: 0.3 % (ref 0–2)
BILIRUB SERPL-MCNC: 0.52 MG/DL (ref 0.2–1.3)
BUN SERPL-MCNC: 11 MG/DL (ref 7–18)
CALCIUM SERPL-MCNC: 8.4 MG/DL (ref 8.5–10.1)
CHLORIDE SERPL-SCNC: 105 MMOL/L (ref 98–107)
CO2 SERPL-SCNC: 28.5 MMOL/L (ref 21–32)
CREAT SERPL-MCNC: 0.8 MG/DL (ref 0.6–1.3)
EOSINOPHIL NFR BLD: 3.4 % (ref 0–7)
ERYTHROCYTE [DISTWIDTH] IN BLOOD BY AUTOMATED COUNT: 13.6 % (ref 11.5–14.5)
GLOBULIN SER-MCNC: 3.3 G/L
GLUCOSE SERPL-MCNC: 97 MG/DL (ref 74–106)
HCT VFR BLD CALC: 39 % (ref 36–48)
HGB BLD-MCNC: 12.8 G/DL (ref 12–16)
IMM GRANULOCYTES NFR BLD: 0.5 % (ref 0–5)
INR PPP: 1.06 (ref 0.85–1.17)
LYMPHOCYTES NFR BLD AUTO: 22.2 % (ref 15–50)
MCH RBC QN AUTO: 31.5 PG (ref 26–34)
MCHC RBC AUTO-ENTMCNC: 32.8 G/DL (ref 31–37)
MCV RBC: 96.1 FL (ref 80–100)
MONOCYTES NFR BLD: 7.4 % (ref 2–11)
NEUTROPHILS NFR BLD AUTO: 66.2 % (ref 40–80)
OSMOLALITY SERPL CALC.SUM OF ELEC: 279 MOSM/KG (ref 275–300)
PLATELET # BLD: 194 10X3/UL (ref 130–400)
PMV BLD AUTO: 8.6 FL (ref 7.4–10.4)
POTASSIUM SERPL-SCNC: 3.2 MMOL/L (ref 3.5–5.1)
PROT SERPL-MCNC: 6.6 G/DL (ref 6.4–8.2)
PROTHROMBIN TIME: 13.8 SECONDS (ref 11.6–15)
RBC # BLD AUTO: 4.06 10X6/UL (ref 4–5.4)
SODIUM SERPL-SCNC: 141 MMOL/L (ref 136–145)
WBC # BLD AUTO: 7.9 10X3/UL (ref 4.8–10.8)

## 2021-04-16 ENCOUNTER — HOSPITAL ENCOUNTER (OUTPATIENT)
Dept: HOSPITAL 84 - D.RAD | Age: 81
Discharge: HOME | End: 2021-04-16
Attending: FAMILY MEDICINE
Payer: MEDICARE

## 2021-04-16 VITALS — BODY MASS INDEX: 25.9 KG/M2

## 2021-04-16 DIAGNOSIS — M25.552: Primary | ICD-10-CM

## 2024-11-21 NOTE — NUR
Per snapshot, left detailed message with results.  Advise patient to call clinic back with any questions.     REC'D PT SITTING IN CHAIR WAITING FOR BREAKFAST TO START. RESP EVEN AND
NONLABORED. NO ACUTE DISTRESS NOTED. PATIENT AMBULATES WITH STEADY GAIT. NO
ASSIST NEEDED. PT IS PLESANT AND COOPERATIVE WITH STAFF. TALKING TO PATIENT
SHE STATED "IS THERE LSD IN MY MEDICAITION CUP?" PT LAUGHED. THIS NURSE TOLD
HER "I DON'T THINK THAT'S LEGAL AND I CAN'T PUT IT IN THERE" NO SI OR
BEHAVIORS NOTED AT THIS TIME. PT IS COMPLIANT WITH MEDS, ASSESSMENT, AND
VITALS. PT HAS NON SKID SOCKS IN PLACE.  WILL CONT PLAN OF CARE.